# Patient Record
Sex: FEMALE | Race: BLACK OR AFRICAN AMERICAN | Employment: OTHER | URBAN - METROPOLITAN AREA
[De-identification: names, ages, dates, MRNs, and addresses within clinical notes are randomized per-mention and may not be internally consistent; named-entity substitution may affect disease eponyms.]

---

## 2020-07-08 ENCOUNTER — OFFICE VISIT (OUTPATIENT)
Dept: ORTHOPEDIC SURGERY | Age: 65
End: 2020-07-08
Payer: COMMERCIAL

## 2020-07-08 ENCOUNTER — TELEPHONE (OUTPATIENT)
Dept: ORTHOPEDIC SURGERY | Age: 65
End: 2020-07-08

## 2020-07-08 VITALS — WEIGHT: 200 LBS | BODY MASS INDEX: 32.14 KG/M2 | HEIGHT: 66 IN | RESPIRATION RATE: 16 BRPM

## 2020-07-08 PROBLEM — M65.311 TRIGGER FINGER OF RIGHT THUMB: Status: ACTIVE | Noted: 2020-07-08

## 2020-07-08 PROBLEM — M17.0 OSTEOARTHRITIS OF BOTH KNEES: Status: ACTIVE | Noted: 2020-07-08

## 2020-07-08 PROCEDURE — 99203 OFFICE O/P NEW LOW 30 MIN: CPT | Performed by: ORTHOPAEDIC SURGERY

## 2020-07-08 NOTE — LETTER
[facility]  Surgery Scheduling Form:      DEMOGRAPHICS:                                                                                                              .    Patient Name:  Yara Monahan  Patient :  1955   Patient SS#:      Patient Phone:  [phone] (home) [phone] (work) Alt. Patient Phone:    Patient Address:  [id number]    PCP:  Barb DUMONT  Payor/Plan Subscr  Sex Relation Sub. Ins. ID Effective Group Num   1. [facility] 1955 Female  [id number]                                    [id number]       DIAGNOSIS & PROCEDURE:                                                                                            .    Diagnosis:   Right thumb trigger finger M65.311  Operation:  Right thumb trigger finger release  [id number]  Location:  [place]  Surgeon:  Cristhian Arnold MD    SCHEDULING INFORMATION:                                                                                         .    Surgeon's Scheduling Instruction:  Elective (within the next week or so)  Requested Date:    OR Time:   Patient Arrival Time:    OR Time Required:  15  Minutes  Anesthesia:  General    SA Required:  Yes  Equipment:    Mini C-Arm:  No    Standard C-Arm:  No  Status:  Outpatient    PAT Required:  Yes  Latex Allergy:  unknown    Defibrilator or Pacemaker:  unknown  Isolation Precautions:  unknown  Comments:                       Noam Johnson MD 20   BILLING INFORMATION:                                                                                                    .    Procedure:       CPT Code Modifier                  Pre-Certification:

## 2020-07-08 NOTE — PROGRESS NOTES
CHIEF COMPLAINT:   1- Right thumb pain/ trigger thumb. 2- Bilateral knee pain/ DJD    HISTORY:  Ms. Anibal Camacho is 75 y.o.  female right handed presents today for evaluation of a right thumb pain which started May 2020 and is worsening over time. Denies trauma or injury. The patient is complaining of triggering and pain right thumb with no numbness or tingling. This is worse with ROM, rest makes pain better. No other complaint. She had bilateral knees cortisone injection on 4/27/2016 with good improvement.      Past Medical History:   Diagnosis Date    Asthma     Cataract     Diabetes mellitus     High blood pressure        Past Surgical History:   Procedure Laterality Date    ENDOMETRIAL ABLATION      HYSTERECTOMY         Social History     Socioeconomic History    Marital status: Single     Spouse name: Not on file    Number of children: Not on file    Years of education: Not on file    Highest education level: Not on file   Occupational History    Occupation: cook/manager   Social Needs    Financial resource strain: Not on file    Food insecurity     Worry: Not on file     Inability: Not on file    Transportation needs     Medical: Not on file     Non-medical: Not on file   Tobacco Use    Smoking status: Never Smoker   Substance and Sexual Activity    Alcohol use: No    Drug use: No    Sexual activity: Not on file   Lifestyle    Physical activity     Days per week: Not on file     Minutes per session: Not on file    Stress: Not on file   Relationships    Social connections     Talks on phone: Not on file     Gets together: Not on file     Attends Yazdanism service: Not on file     Active member of club or organization: Not on file     Attends meetings of clubs or organizations: Not on file     Relationship status: Not on file    Intimate partner violence     Fear of current or ex partner: Not on file     Emotionally abused: Not on file     Physically abused: Not on file Forced sexual activity: Not on file   Other Topics Concern    Not on file   Social History Narrative    Not on file       Family History   Problem Relation Age of Onset    Diabetes Unknown     High Blood Pressure Unknown     Kidney Disease Unknown        Current Outpatient Medications on File Prior to Visit   Medication Sig Dispense Refill    amLODIPine (NORVASC) 10 MG tablet       losartan (COZAAR) 50 MG tablet       atorvastatin (LIPITOR) 40 MG tablet       metFORMIN (GLUCOPHAGE) 500 MG tablet       glimepiride (AMARYL) 1 MG tablet       naproxen (NAPROSYN) 500 MG tablet Take 1 tablet by mouth 2 times daily (with meals) 60 tablet 0    lovastatin (ALTOPREV) 20 MG ER tablet Take 20 mg by mouth nightly.  zolpidem (AMBIEN) 10 MG tablet Take 10 mg by mouth nightly as needed.  FOLIC ACID by Does not apply route.  metformin (GLUCOPHAGE) 1000 MG tablet Take 1,000 mg by mouth 2 times daily (with meals).  PAROXETINE HCL ER PO Take  by mouth.  Ascorbic Acid (VITAMIN C) 500 MG tablet Take 500 mg by mouth daily. No current facility-administered medications on file prior to visit. Pertinent items are noted in HPI  Review of systems reviewed from Patient History Form dated on 7/8/2020 and available in the patient's chart under the Media tab. No change noted. PHYSICAL EXAMINATION:  Ms. Jake Diaz is a very pleasant 75 y.o.  female who presents today in no acute distress, awake, alert, and oriented. She is well dressed, nourished and  groomed. Patient with normal affect. Height is  5' 6\" (1.676 m), weight is 200 lb (90.7 kg), Body mass index is 32.28 kg/m². Resting respiratory rate is 16. Examination of the gait, showed that the patient walks with no limp . Examination of both upper extremities showing a decrease range of motion with triggering of the right thumb compare to the other side.  There is mild swelling that can be seen with tenderness over A1 emigdio. IMAGING: Lennox Grimm were reviewed, taken 7/6/2020 in [facility], 3 views of the right hand, and showed no fracture. Xray standing PA view of both knees, lateral view, and sunrise views of the bilaterall knee was obtained today in the office and reviewed. These demonstrate moderate degenerative changes with narrowing of the joint space in the medial joint space compartment, subchondral sclerosis, and marginal osteophytosis. Both knees with full ROM, mild crepitus, tenderness on medial joint line, stable to varus and valgus stress. IMPRESSION:    1- Right thumb pain/ trigger thumb. 2- Bilateral knee pain/ DJD    PLAN:  I assured the patient that the xray is negative for acute fracture. The patient can take NSAIDs PRN. We recommended Cortisone injection vs surgery. She would like to proceed with surgery right thumb trigger release. I discussed the risks and benefits of surgery with the patient, including but not limited to infection, bleeding, pain, injury to nerves or blood vessels failure of the surgery and need for additional surgery. All the patient's questions were answered. We discussed an expected post-operative course. She  is understanding of this and wishes to proceed.          Dominique Rivas MD

## 2020-11-30 ENCOUNTER — OFFICE VISIT (OUTPATIENT)
Dept: ENDOCRINOLOGY | Age: 65
End: 2020-11-30
Payer: COMMERCIAL

## 2020-11-30 VITALS
WEIGHT: 200 LBS | OXYGEN SATURATION: 96 % | HEIGHT: 66 IN | DIASTOLIC BLOOD PRESSURE: 76 MMHG | BODY MASS INDEX: 32.14 KG/M2 | HEART RATE: 76 BPM | RESPIRATION RATE: 14 BRPM | SYSTOLIC BLOOD PRESSURE: 128 MMHG

## 2020-11-30 PROCEDURE — 99204 OFFICE O/P NEW MOD 45 MIN: CPT | Performed by: INTERNAL MEDICINE

## 2020-11-30 RX ORDER — GLIPIZIDE 5 MG/1
5 TABLET, FILM COATED, EXTENDED RELEASE ORAL DAILY
Qty: 30 TABLET | Refills: 3 | Status: SHIPPED | OUTPATIENT
Start: 2020-11-30 | End: 2021-03-01 | Stop reason: SDUPTHER

## 2020-11-30 RX ORDER — SERTRALINE HYDROCHLORIDE 25 MG/1
25 TABLET, FILM COATED ORAL DAILY
COMMUNITY

## 2020-11-30 NOTE — PROGRESS NOTES
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 Worsened by high CHO    No Hx of CAD , PVD, CVA    Hyperlipidemia:   For   Years  Takes lipitor 40mg  Controlled  LDL 71 on 9/20    Hypertension for years  Stable  Takes norvasc 10mg losartan 50mg      Last eye exam: 5/20  Last foot exam: 11/20  Last microalbumin to creatinine ratio: 10/19    B. Subtotal distal pancreatectomy/splenectomy:  - Well-differentiated pancreatic neuroendocrine neoplasm.  - See microscopic description.  - Eleven peripancreatic and 1 splenic hilar lymph nodes (0/12). - Chronic pancreatitis with islet cell hyperplasia. - Spleen without histopathologic abnormality. She has a h/o toxic nodule per record but euthyroid    8/11 thyroid scan         Impression:         1. Hyperfunctioning nodule within the inferior portion of the right lobe   of       the thyroid.       2.  The 24-hour thyroid uptake is within normal range suggesting a   euthyroid    TSH 1.14  On 7/20  0.43 on 5/11    Does c/o tingling in big toes, h/o mild neuropathy per records    Past Medical History:   Diagnosis Date    Asthma     Cataract     Diabetes mellitus ([facility] [address]     High blood pressure      Past Surgical History:   Procedure Laterality Date    ENDOMETRIAL ABLATION      HYSTERECTOMY skin lesions, toenails are normal, 10 g monofilament is detected    Lab Reviewed   No components found for: CHLPL  Lab Results   Component Value Date    TRIG 149 08/30/2011     Lab Results   Component Value Date    HDL 59 08/30/2011     Lab Results   Component Value Date    LDLCALC 224 (H) 08/30/2011     No results found for: LABVLDL  No results found for: LABA1C    Assessment:     Eliberto Schilder is a 77 y.o. female with :    1.DM : Secondary to pancreatectomy per records. On metformin. A1c slightly high. Discussed goals, risk of complications, cost is an issue with other medications, will add low dose glipizide. Advised with weight loss and diet changes, may be able to decrease medication  2. HTN : Blood pressure at goal  3. HLD ; LDL at goal  4. Obesity: Advised weight loss  5. Thyroid Nodule : Will need thyroid USG , monitor TFT  6.Neuropathy: Mild, monitor    Plan:      Metformin    Add low dose glipizide   Advised to check blood sugar 4 times a day   Patient to send blood sugar log for titration. Advise to exercise regularly, Advise to low simple carbohydrate and protein with each  meal diet. Diabetes Care: recommend yearly eye exam, foot exam and urine microalbumin to   creatinine ratio.

## 2020-12-01 ENCOUNTER — HOSPITAL ENCOUNTER (OUTPATIENT)
Dept: ULTRASOUND IMAGING | Age: 65
Discharge: HOME | End: 2020-12-01
Payer: COMMERCIAL

## 2020-12-01 PROCEDURE — 76536 US EXAM OF HEAD AND NECK: CPT

## 2020-12-01 NOTE — PROGRESS NOTES
Right thyroid lobe:  4.2 x 1.8 x 1.6 cm     Left thyroid lobe:  3.8 x 1.0 x 1.0 cm     Isthmus:  0.4 cm     Thyroid Gland:  Thyroid gland demonstrates normal echotexture and vascularity.     Nodules:     NODULE: Right 1     Size: 16 x 12 x 10 mm    Spongiform, monitor

## 2021-03-01 ENCOUNTER — OFFICE VISIT (OUTPATIENT)
Dept: ENDOCRINOLOGY | Age: 66
End: 2021-03-01
Payer: COMMERCIAL

## 2021-03-01 VITALS
OXYGEN SATURATION: 96 % | HEART RATE: 75 BPM | HEIGHT: 66 IN | SYSTOLIC BLOOD PRESSURE: 145 MMHG | DIASTOLIC BLOOD PRESSURE: 78 MMHG | RESPIRATION RATE: 18 BRPM | BODY MASS INDEX: 32.43 KG/M2 | WEIGHT: 201.8 LBS

## 2021-03-01 DIAGNOSIS — E08.65 DIABETES MELLITUS DUE TO UNDERLYING CONDITION, UNCONTROLLED, WITH HYPERGLYCEMIA (HCC): Primary | ICD-10-CM

## 2021-03-01 LAB — HBA1C MFR BLD: 6.1 %

## 2021-03-01 PROCEDURE — 83036 HEMOGLOBIN GLYCOSYLATED A1C: CPT | Performed by: INTERNAL MEDICINE

## 2021-03-01 PROCEDURE — 99213 OFFICE O/P EST LOW 20 MIN: CPT | Performed by: INTERNAL MEDICINE

## 2021-03-01 RX ORDER — GLIPIZIDE 2.5 MG/1
2.5 TABLET, EXTENDED RELEASE ORAL DAILY
Qty: 30 TABLET | Refills: 3 | Status: SHIPPED | OUTPATIENT
Start: 2021-03-01 | End: 2022-04-04 | Stop reason: SDUPTHER

## 2021-03-01 RX ORDER — DOXYCYCLINE HYCLATE 100 MG/1
100 CAPSULE ORAL 2 TIMES DAILY
COMMUNITY

## 2021-03-01 RX ORDER — HYDROXYZINE HYDROCHLORIDE 25 MG/1
25 TABLET, FILM COATED ORAL
COMMUNITY
Start: 2020-09-14

## 2021-03-01 NOTE — PROGRESS NOTES
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 Worsened by high CHO    No Hx of CAD , PVD, CVA    Hyperlipidemia:   For   Years  Takes lipitor 40mg  Controlled  LDL 71 on 9/20    Hypertension for years  Stable  Takes norvasc 10mg losartan 50mg      Last eye exam: 5/20  Last foot exam: 11/20  Last microalbumin to creatinine ratio: 10/19    B. Subtotal distal pancreatectomy/splenectomy:  - Well-differentiated pancreatic neuroendocrine neoplasm.  - See microscopic description.  - Eleven peripancreatic and 1 splenic hilar lymph nodes (0/12). - Chronic pancreatitis with islet cell hyperplasia. - Spleen without histopathologic abnormality. She has a h/o toxic nodule per record but euthyroid    8/11 thyroid scan         Impression:         1. Hyperfunctioning nodule within the inferior portion of the right lobe   of       the thyroid.       2.  The 24-hour thyroid uptake is within normal range suggesting a   euthyroid    TSH 1.14  On 7/20  0.43 on 5/11 12/20  Right thyroid lobe:  4.2 x 1.8 x 1.6 cm     Left thyroid lobe:  3.8 x 1.0 x 1.0 cm     Isthmus:  0.4 cm     Thyroid Gland:  Thyroid gland demonstrates normal echotexture and vascularity.     Nodules:     NODULE: Right redness  Neck: supple, symmetrical, no swelling  Skin: No obvious rashes or lesions present. Skin and hair texture normal  Psychiatric: Judgement and Insight:  judgement and insight appear normal  Neuro: Normal without focal findings, speech is normal normal, speech is spontaneous  Chest: No labored breathing, no chest deformity, no stridor  Musculoskeletal: No joint deformity, swelling    Lab Reviewed   No components found for: CHLPL  Lab Results   Component Value Date    TRIG 149 08/30/2011     Lab Results   Component Value Date    HDL 59 08/30/2011     Lab Results   Component Value Date    LDLCALC 224 (H) 08/30/2011     No results found for: LABVLDL  No results found for: LABA1C    Assessment:     Vashti Ford is a 77 y.o. female with :    1.DM : Secondary to pancreatectomy per records. On metformin. A1c slightly high. Discussed goals, risk of complications, cost is an issue with other medications, added low dose glipizide. Advised with weight loss and diet changes, may be able to decrease medication  Glipizide stopped by PCP due to concern for low. Advised patient to start on januvia as glucose higher but she would like to restart glipizide due to cost of other medications  Glipizide had helped  2. HTN : Blood pressure at goal  3. HLD ; LDL at goal  4. Obesity: Advised weight loss  5. Thyroid Nodule : 1.6cm spongiform nodule, monitor  6. Neuropathy: Mild, monitor. Add B12    Plan:      Metformin    Add back low dose glipizide 2.5mg   Advised to check blood sugar 4 times a day   Patient to send blood sugar log for titration. Advise to exercise regularly, Advise to low simple carbohydrate and protein with each  meal diet. Diabetes Care: recommend yearly eye exam, foot exam and urine microalbumin to   creatinine ratio.

## 2021-03-05 ENCOUNTER — TELEPHONE (OUTPATIENT)
Dept: ENDOCRINOLOGY | Age: 66
End: 2021-03-05

## 2021-03-05 DIAGNOSIS — E13.9 DM (DIABETES MELLITUS), SECONDARY (HCC): Primary | ICD-10-CM

## 2021-03-05 NOTE — TELEPHONE ENCOUNTER
Patient called and said she was supposed to have blood work done to check her pancreas.    Can that be put in

## 2021-03-22 ENCOUNTER — TELEPHONE (OUTPATIENT)
Dept: ENDOCRINOLOGY | Age: 66
End: 2021-03-22

## 2021-03-22 NOTE — TELEPHONE ENCOUNTER
There are different test for the pancreas.  Would need to discuss at visit about indication and need for testing

## 2021-03-22 NOTE — TELEPHONE ENCOUNTER
I reviewed the results. The ratio of micro albumin /Creatinine is okay , it does not indicate any abnormality.  No additional treatment needed

## 2021-03-22 NOTE — TELEPHONE ENCOUNTER
Pt had a urine test done with her PCP and there was protein in her urine . It was done thru [facility] . They told her to get in touch with Dr. Carrie Espinosa

## 2021-03-22 NOTE — TELEPHONE ENCOUNTER
Called and informed PT of MD message  There are different test for the pancreas.  Would need to discuss at visit about indication and need for testing  PT verbally understood

## 2021-03-22 NOTE — TELEPHONE ENCOUNTER
Called and gave PT md message   I reviewed the results. The ratio of micro albumin /Creatinine is okay , it does not indicate any abnormality.  No additional treatment needed

## 2021-05-03 ENCOUNTER — TELEPHONE (OUTPATIENT)
Dept: ENDOCRINOLOGY | Age: 66
End: 2021-05-03

## 2021-05-03 NOTE — TELEPHONE ENCOUNTER
Patient called and wants to talk to Dr. Mihaela Tobar nurse regarding her medical condition.      Please call [phone]

## 2021-05-28 ENCOUNTER — TELEPHONE (OUTPATIENT)
Dept: ENDOCRINOLOGY | Age: 66
End: 2021-05-28

## 2021-05-28 NOTE — TELEPHONE ENCOUNTER
I spoke with this patient regarding her appt next Tue 6/1/21 with Dr. Kelly Carlson. ..she wanted me to express some concerns she has with her blood sugar levels running a bit high even with her having increased her glipizide medication as advised. They have been running between 179-180 daily. She would like for someone to please follow up with her today. [phone].

## 2021-05-28 NOTE — TELEPHONE ENCOUNTER
Called and informed PT of MD message  I would advise to continue the same dose. May have to discuss adding a new medication next week. Will check A1c in office.  She should bring her log to the visit

## 2021-06-01 ENCOUNTER — OFFICE VISIT (OUTPATIENT)
Dept: ENDOCRINOLOGY | Age: 66
End: 2021-06-01
Payer: COMMERCIAL

## 2021-06-01 VITALS
BODY MASS INDEX: 32.69 KG/M2 | DIASTOLIC BLOOD PRESSURE: 67 MMHG | HEIGHT: 66 IN | WEIGHT: 203.4 LBS | SYSTOLIC BLOOD PRESSURE: 126 MMHG | HEART RATE: 82 BPM | OXYGEN SATURATION: 97 %

## 2021-06-01 DIAGNOSIS — E13.9 DM (DIABETES MELLITUS), SECONDARY (HCC): ICD-10-CM

## 2021-06-01 DIAGNOSIS — E04.1 THYROID NODULE: ICD-10-CM

## 2021-06-01 DIAGNOSIS — E13.9 DM (DIABETES MELLITUS), SECONDARY (HCC): Primary | ICD-10-CM

## 2021-06-01 LAB
ANION GAP SERPL CALCULATED.3IONS-SCNC: 13 MMOL/L (ref 3–16)
BUN BLDV-MCNC: 11 MG/DL (ref 7–20)
CALCIUM SERPL-MCNC: 10.1 MG/DL (ref 8.3–10.6)
CHLORIDE BLD-SCNC: 103 MMOL/L (ref 99–110)
CO2: 26 MMOL/L (ref 21–32)
CREAT SERPL-MCNC: 1 MG/DL (ref 0.6–1.2)
CREATININE URINE: 281.9 MG/DL (ref 28–259)
GFR AFRICAN AMERICAN: >60
GFR NON-AFRICAN AMERICAN: 55
GLUCOSE BLD-MCNC: 140 MG/DL (ref 70–99)
HBA1C MFR BLD: 8.1 %
MICROALBUMIN UR-MCNC: 1.8 MG/DL
MICROALBUMIN/CREAT UR-RTO: 6.4 MG/G (ref 0–30)
POTASSIUM SERPL-SCNC: 4.4 MMOL/L (ref 3.5–5.1)
SODIUM BLD-SCNC: 142 MMOL/L (ref 136–145)
TSH REFLEX: 0.74 UIU/ML (ref 0.27–4.2)

## 2021-06-01 PROCEDURE — 99214 OFFICE O/P EST MOD 30 MIN: CPT | Performed by: INTERNAL MEDICINE

## 2021-06-01 PROCEDURE — 83036 HEMOGLOBIN GLYCOSYLATED A1C: CPT | Performed by: INTERNAL MEDICINE

## 2021-06-01 RX ORDER — ATORVASTATIN CALCIUM 80 MG/1
TABLET, FILM COATED ORAL
COMMUNITY
Start: 2021-04-29

## 2021-06-01 RX ORDER — AZELASTINE HYDROCHLORIDE 0.5 MG/ML
SOLUTION/ DROPS OPHTHALMIC
COMMUNITY
Start: 2021-05-31

## 2021-06-01 RX ORDER — PREDNISOLONE ACETATE 10 MG/ML
SUSPENSION/ DROPS OPHTHALMIC
COMMUNITY
Start: 2021-05-12

## 2021-06-01 RX ORDER — AZELASTINE 1 MG/ML
2 SPRAY, METERED NASAL 2 TIMES DAILY
COMMUNITY
Start: 2021-04-23

## 2021-06-01 RX ORDER — LOSARTAN POTASSIUM 100 MG/1
TABLET ORAL
COMMUNITY
Start: 2021-04-29

## 2021-06-01 RX ORDER — PRAMIPEXOLE DIHYDROCHLORIDE 0.5 MG/1
TABLET ORAL
COMMUNITY
Start: 2021-05-11

## 2021-06-01 NOTE — PROGRESS NOTES
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)  NET per records, not malignant    Known diabetic complications: none   Uncontrolled, moderate    Current diabetic medications     Metformin 1gm BID  Glipizide 2.5mg BID    H/o glimepiride   [facility] and [place] costly  States needs medication which is cheaper    Last A1c  8.1%<-----6.1%<-----7.6%<----- 7.1% <--- 6.9 <--- 8.3    Prior visit with dietician: Yes   Current diet: on average, 3 meals per day   Current exercise: walking   Current monitoring regimen: home blood tests -1-2   times daily     Has brought blood glucose log/meter: yes  Home blood sugar records:  Any episodes of hypoglycemia? Worsened by high CHO    No Hx of CAD , PVD, CVA    Hyperlipidemia:   For   Years  Takes lipitor 40mg  Controlled  LDL 71 on 9/20    Hypertension for years  Stable  Takes norvasc 10mg losartan 50mg      Last eye exam: 5/20  Last foot exam: 11/20  Last microalbumin to creatinine ratio: 10/19    B. Subtotal distal pancreatectomy/splenectomy:  - Well-differentiated pancreatic neuroendocrine neoplasm.  - See microscopic description.  - Eleven peripancreatic and 1 splenic hilar lymph nodes (0/12). - Chronic pancreatitis with islet cell hyperplasia. - Spleen without histopathologic abnormality. She has a h/o toxic nodule per record but euthyroid    8/11 thyroid scan         Impression:         1. Hyperfunctioning nodule within the inferior portion of the right lobe   of       the thyroid.       2.  The 24-hour thyroid uptake is within normal range suggesting a   euthyroid    TSH 1.14  On 7/20  0.43 on 5/11 12/20  Right thyroid lobe:  4.2 x 1.8 x 1.6 cm     Left thyroid lobe:  3.8 x 1.0 x 1.0 cm     Isthmus:  0.4 cm     Thyroid Gland:  Thyroid gland demonstrates normal echotexture and vascularity.     Nodules:     NODULE: Right 1     Size: 16 x 12 x 10 mm    Spongiform, monitor    Does c/o tingling in big toes, h/o mild neuropathy per records    Past Medical History:   Diagnosis Date    Asthma     Cataract     Diabetes mellitus ([address]     High blood pressure      Past Surgical History:   Procedure Laterality Date    ENDOMETRIAL ABLATION      HYSTERECTOMY       Current Outpatient Medications   Medication Sig Dispense Refill    azelastine (OPTIVAR) 0.05 % ophthalmic solution       atorvastatin (LIPITOR) 80 MG tablet       azelastine (ASTELIN) 0.1 % nasal spray 2 sprays by Nasal route 2 times daily      prednisoLONE acetate (PRED FORTE) 1 % ophthalmic suspension INSTILL 1 DROP INTO EACH EYE 4 TIMES DAILY      pramipexole (MIRAPEX) 0.5 MG tablet TAKE 1 TABLET BY MOUTH ONCE DAILY AT BEDTIME FOR 60 DAYS      metFORMIN (GLUCOPHAGE) 500 MG tablet       losartan (COZAAR) 100 MG tablet       hydrOXYzine (ATARAX) 25 MG tablet Take 25 mg by mouth      doxycycline hyclate (VIBRAMYCIN) 100 MG capsule Take 100 mg by mouth 2 times daily      glipiZIDE (GLUCOTROL XL) 2.5 MG extended release tablet Take 1 tablet by mouth daily 30 tablet 3    sertraline (ZOLOFT) 25 MG tablet Take 25 mg by mouth daily      APPLE CIDER VINEGAR PO Take by mouth      Bromelains (BROMELAIN PO) Take by mouth      Famotidine (PEPCID AC PO) Take by mouth      amLODIPine (NORVASC) 10 MG tablet       lovastatin (ALTOPREV) 20 MG ER tablet Take 20 mg by mouth nightly.  FOLIC ACID by Does not apply route.  Ascorbic Acid (VITAMIN C) 500 MG tablet Take 500 mg by mouth daily. No current facility-administered medications for this visit.        Review of Systems  Please see scanned document dated and signed      Objective:      /67   Pulse 82   Ht 5' 6\" (1.676 m)   Wt 203 lb 6.4 oz (92.3 kg)   SpO2 97%   Breastfeeding No   BMI 32.83 kg/m²   Wt Readings from Last 3 Encounters:   06/01/21 203 lb 6.4 oz (92.3 kg)   03/01/21 201 lb 12.8 oz (91.5 kg)   11/30/20 200 lb (90.7 kg)     Constitutional: Well-developed, appears stated age, cooperative, in no acute distress  H/E/N/M/T:atraumatic, normocephalic, external ears, nose, lips normal without lesions  Eyes: Lids, lashes, conjunctivae and sclerae normal, No proptosis, no redness  Neck: supple, symmetrical, no swelling  Skin: No obvious rashes or lesions present. Skin and hair texture normal  Psychiatric: Judgement and Insight:  judgement and insight appear normal  Neuro: Normal without focal findings, speech is normal normal, speech is spontaneous  Chest: No labored breathing, no chest deformity, no stridor  Musculoskeletal: No joint deformity, swelling    Lab Reviewed   No components found for: CHLPL  Lab Results   Component Value Date    TRIG 149 08/30/2011     Lab Results   Component Value Date    HDL 59 08/30/2011     Lab Results   Component Value Date    LDLCALC 224 (H) 08/30/2011     No results found for: LABVLDL  Lab Results   Component Value Date    LABA1C 6.1 03/01/2021       Assessment:     Tawana Farias is a 77 y.o. female with :    1.DM : Secondary to pancreatectomy per records. On metformin. A1c  high. Discussed goals, risk of complications, cost is an issue with other medications, added low dose glipizide. Advised with weight loss and diet changes, may be able to decrease medication  Glipizide stopped by PCP due to concern for low. Advised patient to start on januvia as glucose higher , she was concerned about cost. Check tradjenta cost  Can increase glipizide dose but may have more risk of hypoglycemia  Discussed insulin, she wants to avoid. She states will work on diet changes  2. HTN : Blood pressure at goal  3. HLD ; LDL at goal  4. Obesity: Advised weight loss  5. Thyroid Nodule : 1.6cm spongiform nodule, monitor. Check TSH  6.Neuropathy: Mild, monitor.  Add B12    Plan:      Metformin     glipizide 2.5mg BID   Advised to check blood sugar 4 times a day   Patient to send blood sugar log for titration. Advise to exercise regularly, Advise to low simple carbohydrate and protein with each  meal diet. Diabetes Care: recommend yearly eye exam, foot exam and urine microalbumin to   creatinine ratio.

## 2021-06-03 ENCOUNTER — TELEPHONE (OUTPATIENT)
Dept: ENDOCRINOLOGY | Age: 66
End: 2021-06-03

## 2021-06-03 LAB — C-PEPTIDE: 4.8 NG/ML (ref 1.1–4.4)

## 2021-06-03 NOTE — TELEPHONE ENCOUNTER
Thyroid and urine test normal  Discussed high A1c at visit, adding insulin, she wanted to wait, work on diet changes.  Await C-peptide

## 2021-06-08 ENCOUNTER — OFFICE VISIT (OUTPATIENT)
Dept: ENDOCRINOLOGY | Age: 66
End: 2021-06-08
Payer: COMMERCIAL

## 2021-06-08 DIAGNOSIS — E13.9 DM (DIABETES MELLITUS), SECONDARY (HCC): ICD-10-CM

## 2021-06-08 PROCEDURE — 97803 MED NUTRITION INDIV SUBSEQ: CPT | Performed by: DIETITIAN, REGISTERED

## 2021-06-08 NOTE — PROGRESS NOTES
Medical Nutrition Therapy for Diabetes    Puja Manrique  June 8, 2021      Patient Care Team:  Matt Salgado MD as PCP - General (Allergy)    Reason for visit: Diabetes, Secondary     ASSESSMENT/PLAN:   NUTRITION DIAGNOSIS  Initial visit    #1 Problem: Altered Nutrition-Related Laboratory Values (NC-2.2)  Related to: Endocrine/Diabetes   As Evidenced by: Elevated Plasma glucose and/or HgbA1c levels         #2 Problem: Inconsistent Carbohydrate and Fiber Intake  Related to: Food- and nutrition-related knowledge deficit concerning appropriate amount of carbohydrate and fiber intake  As evidenced by: patient food recall    #3 Problem: Excessive Carbohydrate Intake (NI-5.8. 2)  Related to: Food-and nutrition-related knowledge deficit concerning appropriate amount of carbohydrate intake  As evidenced by: Estimated carbohydrate intake that is consistently more than the recommended amounts    NUTRITION INTERVENTION  Nutrition Prescription: 30 grams carbohydrate per meal with protein and non-starch vegetables  15 gram carbohydrate snacks    Diabetes Education/Counseling included: Pathophysiology of Diabetes  Carbohydrate Control, Activity/exercise, Monitoring, Medications and other: benefits of adequate hydration, quality sleep and stress management. Interventions:  Control Carbohydrate Intake using Plate Guide, Control Carbohydrate Intake using Carb Counting and Increase activity level by walking, have nutrient dense carbohydrate foods in reduced portions. Recommended stopping fluids at 6-7 pm to improve sleep duration. Consult sleep specialist for ideas to help with reduced noise from C-Pap.   Advised increased protein/meat portions to 5-6 ounces lunch and dinner;1-2 ounces breakfast.  Handouts:  Sample menus-[place], Planning Healthy Meals-[employer] Education  NUTRITION MONITORING AND EVALUATION  30 gram carbohydrate meals  Consult sleep specialist to direct on C-Pap   Increase protein   Limit time for drinking fluids       Patient Active Problem List   Diagnosis    Knee pain    Shoulder pain, right    Rotator cuff (capsule) sprain    Primary localized osteoarthrosis, lower leg    Right knee sprain    Osteoarthritis of both knees    Trigger finger of right thumb       Current Outpatient Medications   Medication Sig Dispense Refill    azelastine (OPTIVAR) 0.05 % ophthalmic solution       atorvastatin (LIPITOR) 80 MG tablet       azelastine (ASTELIN) 0.1 % nasal spray 2 sprays by Nasal route 2 times daily      prednisoLONE acetate (PRED FORTE) 1 % ophthalmic suspension INSTILL 1 DROP INTO EACH EYE 4 TIMES DAILY      pramipexole (MIRAPEX) 0.5 MG tablet TAKE 1 TABLET BY MOUTH ONCE DAILY AT BEDTIME FOR 60 DAYS      metFORMIN (GLUCOPHAGE) 500 MG tablet       losartan (COZAAR) 100 MG tablet       linagliptin (TRADJENTA) 5 MG tablet Take 1 tablet by mouth daily 90 tablet 1    hydrOXYzine (ATARAX) 25 MG tablet Take 25 mg by mouth      doxycycline hyclate (VIBRAMYCIN) 100 MG capsule Take 100 mg by mouth 2 times daily      glipiZIDE (GLUCOTROL XL) 2.5 MG extended release tablet Take 1 tablet by mouth daily 30 tablet 3    sertraline (ZOLOFT) 25 MG tablet Take 25 mg by mouth daily      APPLE CIDER VINEGAR PO Take by mouth      Bromelains (BROMELAIN PO) Take by mouth      Famotidine (PEPCID AC PO) Take by mouth      amLODIPine (NORVASC) 10 MG tablet       lovastatin (ALTOPREV) 20 MG ER tablet Take 20 mg by mouth nightly.  FOLIC ACID by Does not apply route.  Ascorbic Acid (VITAMIN C) 500 MG tablet Take 500 mg by mouth daily. No current facility-administered medications for this visit.          NUTRITION ASSESSMENT    Biochemical Data:    Lab Results   Component Value Date    LABA1C 8.1 06/01/2021     No results found for: EAG    Lab Results   Component Value Date    CHOL 313 (H) 08/30/2011     Lab Results   Component Value Date    TRIG 149 08/30/2011     Lab Results   Component Value Date    HDL 59 weekly  Alcohol consumption: No  Usual Food consumption:   3 meals, 45-60 gram carbohydrate meals, modifications made in portions recently     Barriers:   -none          Follow Up Plan: 2 months  Contact information provided.     Referring Provider: Enoch Wong MD  Time spent with patient: 60 minutes

## 2021-06-08 NOTE — LETTER
[facility]  Phone: [phone]  Fax: [phone]    Bruce Avitia RD, LD    Luiza 10, 2021     Mejia Bautista, [address]    Patient: Fer Parsons   MR Number: <[id number]>   YOB: 1955   Date of Visit: 6/8/2021       Dear Mejia Bautista: Thank you for referring Corrinne Filbert to me for evaluation/treatment. Below are the relevant portions of my assessment and plan of care. If you have questions, please do not hesitate to call me. I look forward to following Malissa Rosado along with you.     Sincerely,    Ernst Lyon RD, LD    Ray Wayna Epley, RD, LD

## 2021-06-16 ENCOUNTER — TELEPHONE (OUTPATIENT)
Dept: ENDOCRINOLOGY | Age: 66
End: 2021-06-16

## 2021-06-16 NOTE — TELEPHONE ENCOUNTER
Patient would like to know if foot detox is something she can look into.  And what is the status of detox for a patient like her

## 2021-06-16 NOTE — TELEPHONE ENCOUNTER
Patient has questions concerning her diabetes. She would like for someone to call her back today as soon as possible.

## 2021-06-16 NOTE — TELEPHONE ENCOUNTER
We usually do not recommend Detox treatment.  If she does plan to have it, needs to monitor her glucose during it

## 2021-06-16 NOTE — TELEPHONE ENCOUNTER
Called and informed PT of MD message  We usually do not recommend Detox treatment.  If she does plan to have it, needs to monitor her glucose during it  Pt verbalized understanding

## 2021-07-14 ENCOUNTER — TELEPHONE (OUTPATIENT)
Dept: ENDOCRINOLOGY | Age: 66
End: 2021-07-14

## 2021-07-14 NOTE — TELEPHONE ENCOUNTER
Patient called asking about  recently. She has also seen readings of 200 periodically. She has started soft food selections as a result of Gastric Ulcer diagnosis. Recommended 5-6 small meals. Continue soft foods (avoid fried and spicy food). Encouraged 15-30 gram carbohydrate and protein and non-starch vegetables each meal.  Patient has follow up with me in August 2021.

## 2021-09-01 ENCOUNTER — TELEPHONE (OUTPATIENT)
Dept: ENDOCRINOLOGY | Age: 66
End: 2021-09-01

## 2021-09-01 NOTE — TELEPHONE ENCOUNTER
Yes she can see Jose Luis Koch for the virtual visits.   She also has a thyroid nodule so will need to see me once a year

## 2021-09-01 NOTE — TELEPHONE ENCOUNTER
Patient cancelled her appt today due to car trouble. She is requesting to switch to [facility] for virtual visits.

## 2022-04-04 ENCOUNTER — OFFICE VISIT (OUTPATIENT)
Dept: ENDOCRINOLOGY | Age: 67
End: 2022-04-04
Payer: COMMERCIAL

## 2022-04-04 VITALS
BODY MASS INDEX: 30.57 KG/M2 | SYSTOLIC BLOOD PRESSURE: 121 MMHG | HEART RATE: 82 BPM | WEIGHT: 190.2 LBS | DIASTOLIC BLOOD PRESSURE: 72 MMHG | HEIGHT: 66 IN | OXYGEN SATURATION: 96 %

## 2022-04-04 DIAGNOSIS — E23.6 EMPTY SELLA (HCC): ICD-10-CM

## 2022-04-04 DIAGNOSIS — E13.9 DM (DIABETES MELLITUS), SECONDARY (HCC): Primary | ICD-10-CM

## 2022-04-04 LAB — HBA1C MFR BLD: 9.4 %

## 2022-04-04 PROCEDURE — 83036 HEMOGLOBIN GLYCOSYLATED A1C: CPT | Performed by: INTERNAL MEDICINE

## 2022-04-04 PROCEDURE — 99214 OFFICE O/P EST MOD 30 MIN: CPT | Performed by: INTERNAL MEDICINE

## 2022-04-04 RX ORDER — ALOGLIPTIN 25 MG/1
25 TABLET, FILM COATED ORAL DAILY
Qty: 30 TABLET | Refills: 3 | Status: SHIPPED | OUTPATIENT
Start: 2022-04-04 | End: 2022-04-06

## 2022-04-04 RX ORDER — SUCRALFATE 1 G/1
1 TABLET ORAL
COMMUNITY
Start: 2021-08-24

## 2022-04-04 RX ORDER — GLIPIZIDE 5 MG/1
5 TABLET, FILM COATED, EXTENDED RELEASE ORAL DAILY
Qty: 30 TABLET | Refills: 3 | Status: SHIPPED | OUTPATIENT
Start: 2022-04-04 | End: 2022-04-12

## 2022-04-04 RX ORDER — METHYLPREDNISOLONE 4 MG/1
TABLET ORAL
COMMUNITY
Start: 2022-03-30 | End: 2022-08-24 | Stop reason: ALTCHOICE

## 2022-04-04 RX ORDER — TIZANIDINE 2 MG/1
2 TABLET ORAL EVERY 8 HOURS PRN
COMMUNITY
Start: 2022-03-04

## 2022-04-04 NOTE — PROGRESS NOTES
Seen as  patient for diabetes    Interim:    She had dizziness/fall , hospitalized  MRI showed partial empty sella  Head pressure  On Medtrol dose pack  Will complete it by tomorrow    Partially empty sella which can be associated with hormonal abnormalities such as panhypopituitarism and hypothyroidism. No acute abnormality on MRI      Referred by friend  Had seen endocrinology at 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)  NET per records, not malignant    Known diabetic complications: none   Uncontrolled, moderate    Current diabetic medications     Metformin 1gm BID  Glipizide 2.5mg daily    tradjenta costly    H/o glimepiride   [facility] [place] costly  States needs medication which is cheaper    Last A1c  8.6%<-----8.1%<-----6.1%<-----7.6%<----- 7.1% <--- 6.9 <--- 8.3    Prior visit with dietician: Yes   Current diet: on average, 3 meals per day   Current exercise: walking   Current monitoring regimen: home blood tests -1-2   times daily     Has brought blood glucose log/meter: yes  Home blood sugar records:  Any episodes of hypoglycemia? Worsened by high CHO    No Hx of CAD , PVD, CVA    Hyperlipidemia:   For   Years  Takes lipitor 40mg  Controlled  LDL 71 on 9/20    Hypertension for years  Stable  Takes norvasc 10mg losartan 50mg      Last eye exam: 5/20  Last foot exam: 11/20  Last microalbumin to creatinine ratio: 10/19    B. Subtotal distal pancreatectomy/splenectomy:  - Well-differentiated pancreatic neuroendocrine neoplasm.  - See microscopic description.  - Eleven peripancreatic and 1 splenic hilar lymph nodes (0/12). - Chronic pancreatitis with islet cell hyperplasia. - Spleen without histopathologic abnormality. She has a h/o toxic nodule per record but euthyroid    8/11 thyroid scan         Impression:         1. Hyperfunctioning nodule within the inferior portion of the right lobe   of       the thyroid.       2.  The 24-hour thyroid uptake is within normal range suggesting a   euthyroid    TSH 1.14  On 7/20  0.43 on 5/11 12/20  Right thyroid lobe:  4.2 x 1.8 x 1.6 cm     Left thyroid lobe:  3.8 x 1.0 x 1.0 cm     Isthmus:  0.4 cm     Thyroid Gland:  Thyroid gland demonstrates normal echotexture and vascularity.     Nodules:     NODULE: Right 1     Size: 16 x 12 x 10 mm    Spongiform, monitor    Does c/o tingling in big toes, h/o mild neuropathy per records    Past Medical History:   Diagnosis Date    Asthma     Cataract     Diabetes mellitus ([facility] [address]     High blood pressure      Past Surgical History:   Procedure Laterality Date    ENDOMETRIAL ABLATION      HYSTERECTOMY       Current Outpatient Medications   Medication Sig Dispense Refill    methylPREDNISolone (MEDROL DOSEPACK) 4 MG tablet TAKE BY MOUTH AS DIRECTED      sertraline (ZOLOFT) 50 MG tablet TAKE 1 TABLET BY MOUTH ONCE DAILY      sucralfate (CARAFATE) 1 GM tablet Take 1 g by mouth 4 times daily (before meals and nightly)      tiZANidine (ZANAFLEX) 2 MG tablet Take 2 mg by mouth every 8 hours as needed      azelastine (OPTIVAR) 0.05 % ophthalmic solution       atorvastatin (LIPITOR) 80 MG tablet       azelastine (ASTELIN) 0.1 % nasal spray 2 sprays by Nasal route 2 times daily      prednisoLONE acetate (PRED FORTE) 1 % ophthalmic suspension INSTILL 1 DROP INTO EACH EYE 4 TIMES DAILY      pramipexole (MIRAPEX) 0.5 MG tablet TAKE 1 TABLET BY MOUTH ONCE DAILY AT BEDTIME FOR 60 DAYS      metFORMIN (GLUCOPHAGE) 500 MG tablet       losartan (COZAAR) 100 MG tablet       linagliptin (TRADJENTA) 5 MG tablet Take 1 tablet by mouth daily 90 tablet 1    hydrOXYzine (ATARAX) 25 MG tablet Take 25 mg by mouth      doxycycline hyclate (VIBRAMYCIN) 100 MG capsule Take 100 mg by mouth 2 times daily      sertraline (ZOLOFT) 25 MG tablet Take 25 mg by mouth daily      APPLE CIDER VINEGAR PO Take by mouth      Bromelains (BROMELAIN PO) Take by mouth      Famotidine (PEPCID AC PO) Take by mouth      amLODIPine (NORVASC) 10 MG tablet       lovastatin (ALTOPREV) 20 MG ER tablet Take 20 mg by mouth nightly.  FOLIC ACID by Does not apply route.  Ascorbic Acid (VITAMIN C) 500 MG tablet Take 500 mg by mouth daily.  glipiZIDE (GLUCOTROL XL) 2.5 MG extended release tablet Take 1 tablet by mouth daily 30 tablet 3     No current facility-administered medications for this visit. Review of Systems  Please see scanned document dated and signed      Objective:      /72   Pulse 82   Ht 5' 6\" (1.676 m)   Wt 190 lb 3.2 oz (86.3 kg)   SpO2 96%   Breastfeeding No   BMI 30.70 kg/m²   Wt Readings from Last 3 Encounters:   04/04/22 190 lb 3.2 oz (86.3 kg)   06/01/21 203 lb 6.4 oz (92.3 kg)   03/01/21 201 lb 12.8 oz (91.5 kg)     Constitutional: Well-developed, appears stated age, cooperative, in no acute distress  H/E/N/M/T:atraumatic, normocephalic, external ears, nose, lips normal without lesions  Eyes: Lids, lashes, conjunctivae and sclerae normal, No proptosis, no redness  Neck: supple, symmetrical, no swelling  Skin: No obvious rashes or lesions present. Skin and hair texture normal  Psychiatric: Judgement and Insight:  judgement and insight appear normal  Neuro: Normal without focal findings, speech is normal normal, speech is spontaneous  Chest: No labored breathing, no chest deformity, no stridor  Musculoskeletal: No joint deformity, swelling    Lab Reviewed   No components found for: CHLPL  Lab Results   Component Value Date    TRIG 149 08/30/2011     Lab Results   Component Value Date    HDL 59 08/30/2011     Lab Results   Component Value Date    LDLCALC 224 (H) 08/30/2011     No results found for: LABVLDL  Lab Results   Component Value Date    LABA1C 8.1 06/01/2021       Assessment:     Randal Kunz is a 74 y.o. female with :    1.DM : Secondary to pancreatectomy per records. On metformin. A1c  high.  Discussed goals, risk of complications, cost is an issue with other medications, added low dose glipizide. Advised with weight loss and diet changes, may be able to decrease medication  Glipizide stopped by PCP due to concern for low. Now back on it. Advised patient to start on januvia as glucose higher , she was concerned about cost. Check  Nesina cost   increase glipizide dose but may have more risk of hypoglycemia  Discussed insulin, she wants to avoid. She states will work on diet changes. May start next visit  2. HTN : Blood pressure at goal  3. HLD ; LDL at goal  4. Obesity: Advised weight loss  5. Thyroid Nodule : 1.6cm spongiform nodule, monitor. Check TSH  6.Neuropathy: Mild, monitor. Add B12  7. Dizziness: See PCP, check hormones. Advised may need to see ENT or Neurology  8. Empty sella; Check cortisol and TFT    Plan:      Metformin     glipizide 5mg daily   Nesina   Advised may need insulin   Advised to check blood sugar 4 times a day   Patient to send blood sugar log for titration. Advise to exercise regularly, Advise to low simple carbohydrate and protein with each  meal diet. Diabetes Care: recommend yearly eye exam, foot exam and urine microalbumin to   creatinine ratio.

## 2022-04-06 ENCOUNTER — TELEPHONE (OUTPATIENT)
Dept: ENDOCRINOLOGY | Age: 67
End: 2022-04-06

## 2022-04-06 NOTE — TELEPHONE ENCOUNTER
Patient reports she is not eligible to use [employer]. She spoke to [facility] and they told her that Januvia would be cheaper than Nesina. She is asking if it would be an acceptable change. If so can we send a 90 day supply of Januvia to them and a 10 day supply to [facility] on [address]?

## 2022-04-08 ENCOUNTER — TELEPHONE (OUTPATIENT)
Dept: ENDOCRINOLOGY | Age: 67
End: 2022-04-08

## 2022-04-08 NOTE — TELEPHONE ENCOUNTER
Patient is advising she is having a difficult time with glipizide. Pharmacies are advising either unavailable or too expensive. She has been [facility] with [facility], [facility], and a mail order. She is calling for other alternatives or help. She is not using glipizide at present time. She needs an alternative.       Please call  [phone]

## 2022-04-11 ENCOUNTER — TELEPHONE (OUTPATIENT)
Dept: ENDOCRINOLOGY | Age: 67
End: 2022-04-11

## 2022-04-11 NOTE — TELEPHONE ENCOUNTER
Patient states she has called several times and has had no resolution with getting her medication. She states that she has been going through this for over a week and needs to get the alternative medication for the glipizide which was originally not covered. She is requesting a call back.

## 2022-04-12 RX ORDER — GLIMEPIRIDE 4 MG/1
4 TABLET ORAL EVERY MORNING
Qty: 90 TABLET | Refills: 3 | Status: SHIPPED | OUTPATIENT
Start: 2022-04-12 | End: 2022-08-24 | Stop reason: ALTCHOICE

## 2022-04-12 RX ORDER — GLIMEPIRIDE 4 MG/1
4 TABLET ORAL EVERY MORNING
Qty: 10 TABLET | Refills: 0 | Status: SHIPPED | OUTPATIENT
Start: 2022-04-12 | End: 2022-08-24 | Stop reason: ALTCHOICE

## 2022-04-16 DIAGNOSIS — E23.6 EMPTY SELLA (HCC): ICD-10-CM

## 2022-04-16 DIAGNOSIS — E13.9 DM (DIABETES MELLITUS), SECONDARY (HCC): ICD-10-CM

## 2022-04-16 LAB
CORTISOL - AM: 13.5 UG/DL (ref 4.3–22.4)
T4 FREE: 1 NG/DL (ref 0.9–1.8)
TSH REFLEX: 1.17 UIU/ML (ref 0.27–4.2)

## 2022-04-19 ENCOUNTER — OFFICE VISIT (OUTPATIENT)
Dept: ORTHOPEDIC SURGERY | Age: 67
End: 2022-04-19
Payer: COMMERCIAL

## 2022-04-19 ENCOUNTER — TELEPHONE (OUTPATIENT)
Dept: ENDOCRINOLOGY | Age: 67
End: 2022-04-19

## 2022-04-19 VITALS — WEIGHT: 193 LBS | BODY MASS INDEX: 31.02 KG/M2 | HEIGHT: 66 IN

## 2022-04-19 DIAGNOSIS — M17.0 OSTEOARTHRITIS OF BOTH KNEES, UNSPECIFIED OSTEOARTHRITIS TYPE: Primary | ICD-10-CM

## 2022-04-19 LAB — ADRENOCORTICOTROPIC HORMONE: 28 PG/ML (ref 6–58)

## 2022-04-19 PROCEDURE — 20610 DRAIN/INJ JOINT/BURSA W/O US: CPT | Performed by: ORTHOPAEDIC SURGERY

## 2022-04-19 PROCEDURE — 99214 OFFICE O/P EST MOD 30 MIN: CPT | Performed by: ORTHOPAEDIC SURGERY

## 2022-04-19 RX ORDER — TRIAMCINOLONE ACETONIDE 40 MG/ML
40 INJECTION, SUSPENSION INTRA-ARTICULAR; INTRAMUSCULAR ONCE
Status: COMPLETED | OUTPATIENT
Start: 2022-04-19 | End: 2022-04-19

## 2022-04-19 RX ORDER — LIDOCAINE HYDROCHLORIDE 10 MG/ML
40 INJECTION, SOLUTION INFILTRATION; PERINEURAL ONCE
Status: COMPLETED | OUTPATIENT
Start: 2022-04-19 | End: 2022-04-19

## 2022-04-19 RX ADMIN — TRIAMCINOLONE ACETONIDE 40 MG: 40 INJECTION, SUSPENSION INTRA-ARTICULAR; INTRAMUSCULAR at 16:30

## 2022-04-19 RX ADMIN — LIDOCAINE HYDROCHLORIDE 40 MG: 10 INJECTION, SOLUTION INFILTRATION; PERINEURAL at 16:29

## 2022-04-19 RX ADMIN — LIDOCAINE HYDROCHLORIDE 40 MG: 10 INJECTION, SOLUTION INFILTRATION; PERINEURAL at 16:27

## 2022-04-19 NOTE — PROGRESS NOTES
CHIEF COMPLAINT: Bilateral knee pain/ osteoarthritis. HISTORY:  Ms. Chiki Stacy 74 y.o.  female presents today for the first visit for evaluation of bilateral knee pain which started to worsen over the past few days, but her knees have been bothering her for the past greater than 5 years. She is complaining of achy pain. Pain is increase with standing and walking and decrease with rest. Pain is sharp early in the morning with first few steps, dull achy pain by the end of the day. She rates her pain a 6/10 VAS and worsening. Alleviating factors: rest. No radiation and no numbness and tingling sensation. No other complaint. No h/o trauma or gout. She is previously seen us for her knees in July 2020 and had NSAIDs with some improvement. She did have bilateral knee cortisone injections on 4/27/2016 with good improvement. Denies smoking. She is well-known to me for a right trigger thumb release.     Past Medical History:   Diagnosis Date    Asthma     Cataract     Diabetes mellitus ([facility] [address]     High blood pressure        Past Surgical History:   Procedure Laterality Date    ENDOMETRIAL ABLATION      HYSTERECTOMY         Social History     Socioeconomic History    Marital status: Single     Spouse name: Not on file    Number of children: Not on file    Years of education: Not on file    Highest education level: Not on file   Occupational History    Occupation: cook/manager   Tobacco Use    Smoking status: Never Smoker    Smokeless tobacco: Never Used   Substance and Sexual Activity    Alcohol use: No    Drug use: No    Sexual activity: Not on file   Other Topics Concern    Not on file   Social History Narrative    Not on file     Social Determinants of Health     Financial Resource Strain:     Difficulty of Paying Living Expenses: Not on file   Food Insecurity:     Worried About Running Out of Food in the Last Year: Not on file    John of Food in the Last Year: Not on file Transportation Needs:     Lack of Transportation (Medical): Not on file    Lack of Transportation (Non-Medical):  Not on file   Physical Activity:     Days of Exercise per Week: Not on file    Minutes of Exercise per Session: Not on file   Stress:     Feeling of Stress : Not on file   Social Connections:     Frequency of Communication with Friends and Family: Not on file    Frequency of Social Gatherings with Friends and Family: Not on file    Attends Scientologist Services: Not on file    Active Member of [address] [place] [address] or Organizations: Not on file    Attends Club or Organization Meetings: Not on file    Marital Status: Not on file   Intimate Partner Violence:     Fear of Current or Ex-Partner: Not on file    Emotionally Abused: Not on file    Physically Abused: Not on file    Sexually Abused: Not on file   Housing Stability:     Unable to Pay for Housing in the Last Year: Not on file    Number of [place] in the Last Year: Not on file    Unstable Housing in the Last Year: Not on file       Family History   Problem Relation Age of Onset    Diabetes Other     High Blood Pressure Other     Kidney Disease Other        Current Outpatient Medications on File Prior to Visit   Medication Sig Dispense Refill    SITagliptin (JANUVIA) 100 MG tablet Take 1 tablet by mouth daily 10 tablet 0    glimepiride (AMARYL) 4 MG tablet Take 1 tablet by mouth every morning 90 tablet 3    glimepiride (AMARYL) 4 MG tablet Take 1 tablet by mouth every morning 10 tablet 0    methylPREDNISolone (MEDROL DOSEPACK) 4 MG tablet TAKE BY MOUTH AS DIRECTED      sertraline (ZOLOFT) 50 MG tablet TAKE 1 TABLET BY MOUTH ONCE DAILY      sucralfate (CARAFATE) 1 GM tablet Take 1 g by mouth 4 times daily (before meals and nightly)      tiZANidine (ZANAFLEX) 2 MG tablet Take 2 mg by mouth every 8 hours as needed      azelastine (OPTIVAR) 0.05 % ophthalmic solution       atorvastatin (LIPITOR) 80 MG tablet       azelastine (ASTELIN) 0.1 % nasal spray 2 sprays by Nasal route 2 times daily      prednisoLONE acetate (PRED FORTE) 1 % ophthalmic suspension INSTILL 1 DROP INTO EACH EYE 4 TIMES DAILY      pramipexole (MIRAPEX) 0.5 MG tablet TAKE 1 TABLET BY MOUTH ONCE DAILY AT BEDTIME FOR 60 DAYS      metFORMIN (GLUCOPHAGE) 500 MG tablet       losartan (COZAAR) 100 MG tablet       linagliptin (TRADJENTA) 5 MG tablet Take 1 tablet by mouth daily 90 tablet 1    hydrOXYzine (ATARAX) 25 MG tablet Take 25 mg by mouth      doxycycline hyclate (VIBRAMYCIN) 100 MG capsule Take 100 mg by mouth 2 times daily      sertraline (ZOLOFT) 25 MG tablet Take 25 mg by mouth daily      APPLE CIDER VINEGAR PO Take by mouth      Bromelains (BROMELAIN PO) Take by mouth      Famotidine (PEPCID AC PO) Take by mouth      amLODIPine (NORVASC) 10 MG tablet       lovastatin (ALTOPREV) 20 MG ER tablet Take 20 mg by mouth nightly.  FOLIC ACID by Does not apply route.  Ascorbic Acid (VITAMIN C) 500 MG tablet Take 500 mg by mouth daily. No current facility-administered medications on file prior to visit. Pertinent items are noted in HPI  Review of systems reviewed from Patient History Form and available in the patient's chart under the Media tab. PHYSICAL EXAMINATION:  Ms. Shania Erazo is a very pleasant 74 y.o.  female who presents today in no acute distress, awake, alert, and oriented. She is well dressed, nourished and  groomed. Patient with normal affect. Height is  5' 6\" (1.676 m), weight is 193 lb (87.5 kg), Body mass index is 31.15 kg/m². Resting respiratory rate is 16. Examination of the gait, showed that the patient walks heel-toe with a non-antalgic gait and no limp. Examination of both knees showing full ROM, bilateral mild crepitus, tenderness on medial joint line, stable to varus and valgus stress. She has intact sensation and good pedal pulses.  She has good strength in 2 planes, and has mild tenderness on deep palpation over the medial joint line. Knee reflex 1+ bilaterally. IMAGING:  Xray 3 views of the bilateral knee was obtained today in the office and reviewed. These demonstrate moderate degenerative changes with narrowing of the joint space in the medial joint space compartment, subchondral sclerosis, and marginal osteophytosis. IMPRESSION: Bilateral knee DJD. PLAN: I discussed with the patient the treatment options including both surgical and non-surgical treatment. We recommended Quad exercises and stretching of the calf and hamstrings which was taught to the patient today. She will take NSAIDS as needed. I believe she will benefit from cortisone injection bilateral knee, and she agreed to have. PROCEDURE:    With the patient's permission, and under sterile condition, the bilateral knee was prepared and draped with alcohol and injected with a mixture of 1 mL Kenalog 40mg and 4 mL of 1% lidocaine through the medial parapatellar port area. The patient tolerated the procedure well. A band-aid was applied and the patient was advised to ice the knee for 15-20 minutes to relieve any injection site related pain. She reported a good improvement immediatly after the injection. F/u in 3-4 months, PT if needed. She understands that this may need surgery if the pain did not to resolve.        Masood Siddiqui MD

## 2022-04-19 NOTE — TELEPHONE ENCOUNTER
Please advise the cortisol and thyroid level are normal. The labs did not indicate any hormonal abnormality

## 2022-04-22 ENCOUNTER — TELEPHONE (OUTPATIENT)
Dept: ENDOCRINOLOGY | Age: 67
End: 2022-04-22

## 2022-04-22 NOTE — TELEPHONE ENCOUNTER
Pt called and states she's out of her Januvia as of today. She would like a rx sent to [facility] on [address] [place] [address] and another sent to [facility]. Pt did states it needs a Prior Auth (sent separate message to PA dept) to do PA  Pt wants a call back from the nurse. Pt thought  PA were from only certain pharmacies and had to be done separately.  Instructed to pt its a general PA not per pharmacy    # [phone]

## 2022-04-26 NOTE — TELEPHONE ENCOUNTER
Medication:   Requested Prescriptions     Pending Prescriptions Disp Refills    SITagliptin (JANUVIA) 100 MG tablet 90 tablet 1     Sig: Take 1 tablet by mouth daily       Last Filled:      Patient Phone Number: [phone] (home)     Last appt: 4/4/2022   Next appt: 5/18/2022    Last Labs DM:   Lab Results   Component Value Date    LABA1C 9.4 04/04/2022

## 2022-05-04 ENCOUNTER — TELEPHONE (OUTPATIENT)
Dept: ENDOCRINOLOGY | Age: 67
End: 2022-05-04

## 2022-05-09 DIAGNOSIS — E13.9 DM (DIABETES MELLITUS), SECONDARY (HCC): Primary | ICD-10-CM

## 2022-05-09 RX ORDER — GLIPIZIDE 2.5 MG/1
TABLET, EXTENDED RELEASE ORAL
Qty: 30 TABLET | Refills: 3 | Status: SHIPPED | OUTPATIENT
Start: 2022-05-09

## 2022-05-09 NOTE — TELEPHONE ENCOUNTER
Medication:   Requested Prescriptions     Pending Prescriptions Disp Refills    glipiZIDE (GLUCOTROL XL) 2.5 MG extended release tablet [Pharmacy Med Name: glipiZIDE ER 2.5 MG Oral Tablet Extended Release 24 Hour] 30 tablet 0     Sig: Take 1 tablet by mouth once daily       Last Filled:      Patient Phone Number: [phone] (home)     Last appt: 4/4/22   Next appt: 5/18/22    Last Labs DM:   Lab Results   Component Value Date    LABA1C 9.4 04/04/2022

## 2022-05-18 ENCOUNTER — OFFICE VISIT (OUTPATIENT)
Dept: ENDOCRINOLOGY | Age: 67
End: 2022-05-18
Payer: COMMERCIAL

## 2022-05-18 VITALS
DIASTOLIC BLOOD PRESSURE: 56 MMHG | WEIGHT: 190.2 LBS | OXYGEN SATURATION: 95 % | BODY MASS INDEX: 30.57 KG/M2 | HEART RATE: 78 BPM | SYSTOLIC BLOOD PRESSURE: 99 MMHG | HEIGHT: 66 IN

## 2022-05-18 DIAGNOSIS — E11.9 CONTROLLED TYPE 2 DIABETES MELLITUS WITHOUT COMPLICATION, WITHOUT LONG-TERM CURRENT USE OF INSULIN (HCC): Primary | ICD-10-CM

## 2022-05-18 DIAGNOSIS — I10 PRIMARY HYPERTENSION: ICD-10-CM

## 2022-05-18 PROCEDURE — 3046F HEMOGLOBIN A1C LEVEL >9.0%: CPT | Performed by: INTERNAL MEDICINE

## 2022-05-18 PROCEDURE — 99214 OFFICE O/P EST MOD 30 MIN: CPT | Performed by: INTERNAL MEDICINE

## 2022-05-18 NOTE — PROGRESS NOTES
Seen as  patient for diabetes    Interim:    Glucose better  Januvia costly    Partially empty sella which can be associated with hormonal abnormalities such as panhypopituitarism and hypothyroidism. 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)  NET per records, not malignant    Known diabetic complications: none   Uncontrolled, moderate    Current diabetic medications     Metformin 1gm BID  Glipizide 2.5mg daily  Januvia 100mg- costly    tradjenta costly    H/o glimepiride   [facility] [place] costly  States needs medication which is cheaper    Last A1c 7.7%<--- 9.4%<------8.6%<-----8.1%<-----6.1%<-----7.6%<----- 7.1% <--- 6.9 <--- 8.3    Prior visit with dietician: Yes   Current diet: on average, 3 meals per day   Current exercise: walking   Current monitoring regimen: home blood tests -1-2   times daily     Has brought blood glucose log/meter: yes  Home blood sugar records:  Any episodes of hypoglycemia? occ in 60s  Worsened by high CHO    No Hx of CAD , PVD, CVA    Hyperlipidemia:   For   Years  Takes lipitor 40mg  Controlled  LDL 71 on 9/20    Hypertension for years  Stable  Takes norvasc 10mg losartan 50mg      Last eye exam: 5/20  Last foot exam: 11/20  Last microalbumin to creatinine ratio: 10/19    B. Subtotal distal pancreatectomy/splenectomy:  - Well-differentiated pancreatic neuroendocrine neoplasm.  - See microscopic description.  - Eleven peripancreatic and 1 splenic hilar lymph nodes (0/12). - Chronic pancreatitis with islet cell hyperplasia. - Spleen without histopathologic abnormality. She has a h/o toxic nodule per record but euthyroid    8/11 thyroid scan         Impression:         1. Hyperfunctioning nodule within the inferior portion of the right lobe   of       the thyroid.       2.  The 24-hour thyroid uptake is within normal range suggesting a euthyroid    TSH 1.14  On 7/20  0.43 on 5/11 12/20  Right thyroid lobe:  4.2 x 1.8 x 1.6 cm     Left thyroid lobe:  3.8 x 1.0 x 1.0 cm     Isthmus:  0.4 cm     Thyroid Gland:  Thyroid gland demonstrates normal echotexture and vascularity.     Nodules:     NODULE: Right 1     Size: 16 x 12 x 10 mm    Spongiform, monitor    Does c/o tingling in big toes, h/o mild neuropathy per records    Past Medical History:   Diagnosis Date    Asthma     Cataract     [facility] mellitus ([facility] [address]     High blood pressure      Past Surgical History:   Procedure Laterality Date    ENDOMETRIAL ABLATION      HYSTERECTOMY       Current Outpatient Medications   Medication Sig Dispense Refill    glipiZIDE (GLUCOTROL XL) 2.5 MG extended release tablet Take 1 tablet by mouth once daily 30 tablet 3    SITagliptin (JANUVIA) 100 MG tablet Take 1 tablet by mouth daily 90 tablet 1    sertraline (ZOLOFT) 50 MG tablet TAKE 1 TABLET BY MOUTH ONCE DAILY      sucralfate (CARAFATE) 1 GM tablet Take 1 g by mouth 4 times daily (before meals and nightly)      tiZANidine (ZANAFLEX) 2 MG tablet Take 2 mg by mouth every 8 hours as needed      azelastine (OPTIVAR) 0.05 % ophthalmic solution       atorvastatin (LIPITOR) 80 MG tablet       azelastine (ASTELIN) 0.1 % nasal spray 2 sprays by Nasal route 2 times daily      prednisoLONE acetate (PRED FORTE) 1 % ophthalmic suspension INSTILL 1 DROP INTO EACH EYE 4 TIMES DAILY      pramipexole (MIRAPEX) 0.5 MG tablet TAKE 1 TABLET BY MOUTH ONCE DAILY AT BEDTIME FOR 60 DAYS      metFORMIN (GLUCOPHAGE) 500 MG tablet       losartan (COZAAR) 100 MG tablet       linagliptin (TRADJENTA) 5 MG tablet Take 1 tablet by mouth daily 90 tablet 1    hydrOXYzine (ATARAX) 25 MG tablet Take 25 mg by mouth      doxycycline hyclate (VIBRAMYCIN) 100 MG capsule Take 100 mg by mouth 2 times daily      sertraline (ZOLOFT) 25 MG tablet Take 25 mg by mouth daily      APPLE CIDER VINEGAR PO Take by mouth      Bromelains (BROMELAIN PO) Take by mouth      Famotidine (PEPCID AC PO) Take by mouth      amLODIPine (NORVASC) 10 MG tablet       lovastatin (ALTOPREV) 20 MG ER tablet Take 20 mg by mouth nightly.  FOLIC ACID by Does not apply route.  Ascorbic Acid (VITAMIN C) 500 MG tablet Take 500 mg by mouth daily.  glimepiride (AMARYL) 4 MG tablet Take 1 tablet by mouth every morning 90 tablet 3    glimepiride (AMARYL) 4 MG tablet Take 1 tablet by mouth every morning 10 tablet 0    methylPREDNISolone (MEDROL DOSEPACK) 4 MG tablet TAKE BY MOUTH AS DIRECTED       No current facility-administered medications for this visit. Review of Systems  Please see scanned document dated and signed      Objective:      BP (!) 99/56   Pulse 78   Ht 5' 6\" (1.676 m)   Wt 190 lb 3.2 oz (86.3 kg)   SpO2 95%   Breastfeeding No   BMI 30.70 kg/m²   Wt Readings from Last 3 Encounters:   05/18/22 190 lb 3.2 oz (86.3 kg)   04/19/22 193 lb (87.5 kg)   04/04/22 190 lb 3.2 oz (86.3 kg)     Constitutional: Well-developed, appears stated age, cooperative, in no acute distress  H/E/N/M/T:atraumatic, normocephalic, external ears, nose, lips normal without lesions  Eyes: Lids, lashes, conjunctivae and sclerae normal, No proptosis, no redness  Neck: supple, symmetrical, no swelling  Skin: No obvious rashes or lesions present.   Skin and hair texture normal  Psychiatric: Judgement and Insight:  judgement and insight appear normal  Neuro: Normal without focal findings, speech is normal normal, speech is spontaneous  Chest: No labored breathing, no chest deformity, no stridor  Musculoskeletal: No joint deformity, swelling    Lab Reviewed   No components found for: CHLPL  Lab Results   Component Value Date    TRIG 149 08/30/2011     Lab Results   Component Value Date    HDL 59 08/30/2011     Lab Results   Component Value Date    LDLCALC 224 (H) 08/30/2011     No results found for: LABVLDL  Lab Results   Component Value Date    LABA1C 9.4 04/04/2022       Assessment:     Jeana Albert is a 74 y.o. female with :    1.DM : Secondary to pancreatectomy per records. On metformin. A1c was  high. Discussed goals, risk of complications, cost is an issue with other medications, added low dose glipizide. Advised with weight loss and diet changes, may be able to decrease medication  Glipizide stopped by PCP due to concern for low. Now back on it. Advised patient to start on januvia as glucose higher , she was concerned about cost, is taking now. Darlene Chain is also costly. increase glipizide dose but may have more risk of hypoglycemia  Discussed insulin, she wants to avoid. She states will work on diet changes. Check cost of jardiance, farxiga, tradjenta  Discussed Actos, she would like to avoid due to weight gain and swelling with it  She would like to de escalate therapy if A1c improves  2. HTN : Blood pressure at goal  3. HLD ; LDL at goal  4. Obesity: Advised weight loss  5. Thyroid Nodule : 1.6cm spongiform nodule, monitor. Check TSH  6.Neuropathy: Mild, monitor. Add B12  7. Dizziness: See PCP,nl  hormones. Advised may need to see ENT or Neurology  8. Empty sella; No  cortisol and TFT    Plan:      Metformin     glipizide 2.5mg daily   Januvia   Advised may need insulin   Advised to check blood sugar 4 times a day   Patient to send blood sugar log for titration. Advise to exercise regularly, Advise to low simple carbohydrate and protein with each  meal diet. Diabetes Care: recommend yearly eye exam, foot exam and urine microalbumin to   creatinine ratio.

## 2022-08-24 ENCOUNTER — OFFICE VISIT (OUTPATIENT)
Dept: ENDOCRINOLOGY | Age: 67
End: 2022-08-24
Payer: MEDICARE

## 2022-08-24 VITALS
BODY MASS INDEX: 30.18 KG/M2 | HEIGHT: 66 IN | HEART RATE: 68 BPM | SYSTOLIC BLOOD PRESSURE: 134 MMHG | WEIGHT: 187.8 LBS | DIASTOLIC BLOOD PRESSURE: 76 MMHG | OXYGEN SATURATION: 96 %

## 2022-08-24 DIAGNOSIS — E11.9 CONTROLLED TYPE 2 DIABETES MELLITUS WITHOUT COMPLICATION, WITHOUT LONG-TERM CURRENT USE OF INSULIN (HCC): Primary | ICD-10-CM

## 2022-08-24 LAB
CREATININE URINE: 269.2 MG/DL (ref 28–259)
HBA1C MFR BLD: 5.4 %
MICROALBUMIN UR-MCNC: <1.2 MG/DL
MICROALBUMIN/CREAT UR-RTO: ABNORMAL MG/G (ref 0–30)

## 2022-08-24 PROCEDURE — 83036 HEMOGLOBIN GLYCOSYLATED A1C: CPT | Performed by: INTERNAL MEDICINE

## 2022-08-24 PROCEDURE — 3044F HG A1C LEVEL LT 7.0%: CPT | Performed by: INTERNAL MEDICINE

## 2022-08-24 PROCEDURE — 1123F ACP DISCUSS/DSCN MKR DOCD: CPT | Performed by: INTERNAL MEDICINE

## 2022-08-24 PROCEDURE — 99214 OFFICE O/P EST MOD 30 MIN: CPT | Performed by: INTERNAL MEDICINE

## 2022-08-24 NOTE — PROGRESS NOTES
Seen as  patient for diabetes    Interim:    Glucose better  Eating better    Partially empty sella which can be associated with hormonal abnormalities such as panhypopituitarism and hypothyroidism. 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)  NET per records, not malignant    Known diabetic complications: none   Uncontrolled, moderate    Current diabetic medications     Metformin 1gm BID  Glipizide 2.5mg daily  Januvia 100mg- costly    tradjenta costly    H/o glimepiride   [facility] [place] costly  States needs medication which is cheaper    Last A1c 5.4%<-------7.7%<--- 9.4%<------8.6%<-----8.1%<-----6.1%<-----7.6%<----- 7.1% <--- 6.9 <--- 8.3    Prior visit with dietician: Yes   Current diet: on average, 3 meals per day   Current exercise: walking   Current monitoring regimen: home blood tests -1-2   times daily     Has brought blood glucose log/meter: yes  Home blood sugar records:  Any episodes of hypoglycemia? occ in 60s  Worsened by high CHO    No Hx of CAD , PVD, CVA    Hyperlipidemia:   For   Years  Takes lipitor 40mg  Controlled  LDL 71 on 9/20    Hypertension for years  Stable  Takes norvasc 10mg losartan 50mg      Last eye exam: 5/20  Last foot exam: 11/20  Last microalbumin to creatinine ratio: 6/21    B. Subtotal distal pancreatectomy/splenectomy:  - Well-differentiated pancreatic neuroendocrine neoplasm.  - See microscopic description.  - Eleven peripancreatic and 1 splenic hilar lymph nodes (0/12). - Chronic pancreatitis with islet cell hyperplasia. - Spleen without histopathologic abnormality. She has a h/o toxic nodule per record but euthyroid    8/11 thyroid scan         Impression:         1. Hyperfunctioning nodule within the inferior portion of the right lobe   of       the thyroid.        2. The 24-hour thyroid uptake is within normal range suggesting a euthyroid    TSH 1.14  On 7/20  0.43 on 5/11 12/20  Right thyroid lobe:  4.2 x 1.8 x 1.6 cm     Left thyroid lobe:  3.8 x 1.0 x 1.0 cm     Isthmus:  0.4 cm     Thyroid Gland:  Thyroid gland demonstrates normal echotexture and vascularity.      Nodules:     NODULE: Right 1     Size: 16 x 12 x 10 mm    Spongiform, monitor    Does c/o tingling in big toes, h/o mild neuropathy per records    Past Medical History:   Diagnosis Date    Asthma     Cataract     Diabetes mellitus ([facility] [address]     High blood pressure      Past Surgical History:   Procedure Laterality Date    ENDOMETRIAL ABLATION      HYSTERECTOMY       Current Outpatient Medications   Medication Sig Dispense Refill    glipiZIDE (GLUCOTROL XL) 2.5 MG extended release tablet Take 1 tablet by mouth once daily 30 tablet 3    SITagliptin (JANUVIA) 100 MG tablet Take 1 tablet by mouth daily 90 tablet 1    sertraline (ZOLOFT) 50 MG tablet TAKE 1 TABLET BY MOUTH ONCE DAILY      sucralfate (CARAFATE) 1 GM tablet Take 1 g by mouth 4 times daily (before meals and nightly)      tiZANidine (ZANAFLEX) 2 MG tablet Take 2 mg by mouth every 8 hours as needed      azelastine (OPTIVAR) 0.05 % ophthalmic solution       atorvastatin (LIPITOR) 80 MG tablet       azelastine (ASTELIN) 0.1 % nasal spray 2 sprays by Nasal route 2 times daily      prednisoLONE acetate (PRED FORTE) 1 % ophthalmic suspension INSTILL 1 DROP INTO EACH EYE 4 TIMES DAILY      pramipexole (MIRAPEX) 0.5 MG tablet TAKE 1 TABLET BY MOUTH ONCE DAILY AT BEDTIME FOR 60 DAYS      metFORMIN (GLUCOPHAGE) 500 MG tablet       losartan (COZAAR) 100 MG tablet       linagliptin (TRADJENTA) 5 MG tablet Take 1 tablet by mouth daily 90 tablet 1    hydrOXYzine (ATARAX) 25 MG tablet Take 25 mg by mouth      doxycycline hyclate (VIBRAMYCIN) 100 MG capsule Take 100 mg by mouth 2 times daily      sertraline (ZOLOFT) 25 MG tablet Take 25 mg by mouth daily      APPLE CIDER VINEGAR PO Take by mouth      Bromelains (BROMELAIN PO) Take by mouth      Famotidine (PEPCID AC PO) Take by mouth      amLODIPine (NORVASC) 10 MG tablet       lovastatin (ALTOPREV) 20 MG ER tablet Take 20 mg by mouth nightly. FOLIC ACID by Does not apply route. Ascorbic Acid (VITAMIN C) 500 MG tablet Take 500 mg by mouth daily. No current facility-administered medications for this visit. Review of Systems  Please see scanned document dated and signed      Objective:      /76   Pulse 68   Ht 5' 6\" (1.676 m)   Wt 187 lb 12.8 oz (85.2 kg)   SpO2 96%   BMI 30.31 kg/m²   Wt Readings from Last 3 Encounters:   08/24/22 187 lb 12.8 oz (85.2 kg)   05/18/22 190 lb 3.2 oz (86.3 kg)   04/19/22 193 lb (87.5 kg)     Constitutional: Well-developed, appears stated age, cooperative, in no acute distress  H/E/N/M/T:atraumatic, normocephalic, external ears, nose, lips normal without lesions  Eyes: Lids, lashes, conjunctivae and sclerae normal, No proptosis, no redness  Neck: supple, symmetrical, no swelling  Skin: No obvious rashes or lesions present. Skin and hair texture normal  Psychiatric: Judgement and Insight:  judgement and insight appear normal  Neuro: Normal without focal findings, speech is normal normal, speech is spontaneous  Chest: No labored breathing, no chest deformity, no stridor  Musculoskeletal: No joint deformity, swelling    Foot exam:Monofilament detected, no ulcers    Lab Reviewed   No components found for: CHLPL  Lab Results   Component Value Date    TRIG 149 08/30/2011     Lab Results   Component Value Date    HDL 59 08/30/2011     Lab Results   Component Value Date    LDLCALC 224 (H) 08/30/2011     No results found for: LABVLDL  Lab Results   Component Value Date    LABA1C 9.4 04/04/2022       Assessment:     Janelle Madrid is a 74 y.o. female with :    1.DM : Secondary to pancreatectomy per records. On metformin. A1c was  high. Discussed goals, risk of complications, cost is an issue with other medications, added low dose glipizide. Advised with weight loss and diet changes, may be able to decrease medication  Glipizide stopped by PCP due to concern for low. Now back on it. Advised patient to start on januvia as glucose higher , she was concerned about cost, is taking now. Qian Solo is also costly. increase glipizide dose but may have more risk of hypoglycemia  Discussed insulin, she wants to avoid. She states will work on diet changes. Check cost of jardiance, farxiga  Discussed Actos, she would like to avoid due to weight gain and swelling with it  She would like to de escalate therapy if A1c improves  2. HTN : Blood pressure at goal  3. HLD ; LDL at goal  4. Obesity: Advised weight loss  5. Thyroid Nodule : 1.6cm spongiform nodule, monitor. Check TSH  6.Neuropathy: Mild, monitor. Add B12  7. Dizziness: See PCP,nl  hormones. Advised may need to see ENT or Neurology  8. Empty sella; No  cortisol and TFT    Plan:      Metformin     glipizide 2.5mg daily   Januvia   Advised may need insulin   Advised to check blood sugar 4 times a day   Patient to send blood sugar log for titration. Advise to exercise regularly, Advise to low simple carbohydrate and protein with each  meal diet. Diabetes Care: recommend yearly eye exam, foot exam and urine microalbumin to   creatinine ratio.

## 2022-12-14 ENCOUNTER — OFFICE VISIT (OUTPATIENT)
Dept: ENDOCRINOLOGY | Age: 67
End: 2022-12-14
Payer: MEDICARE

## 2022-12-14 VITALS
BODY MASS INDEX: 31.18 KG/M2 | TEMPERATURE: 98 F | DIASTOLIC BLOOD PRESSURE: 86 MMHG | HEIGHT: 66 IN | RESPIRATION RATE: 14 BRPM | WEIGHT: 194 LBS | SYSTOLIC BLOOD PRESSURE: 138 MMHG | HEART RATE: 73 BPM

## 2022-12-14 DIAGNOSIS — E04.1 THYROID NODULE: ICD-10-CM

## 2022-12-14 DIAGNOSIS — E11.9 CONTROLLED TYPE 2 DIABETES MELLITUS WITHOUT COMPLICATION, WITHOUT LONG-TERM CURRENT USE OF INSULIN (HCC): Primary | ICD-10-CM

## 2022-12-14 LAB — HBA1C MFR BLD: 5.7 %

## 2022-12-14 PROCEDURE — 99214 OFFICE O/P EST MOD 30 MIN: CPT | Performed by: INTERNAL MEDICINE

## 2022-12-14 PROCEDURE — 3044F HG A1C LEVEL LT 7.0%: CPT | Performed by: INTERNAL MEDICINE

## 2022-12-14 PROCEDURE — 83036 HEMOGLOBIN GLYCOSYLATED A1C: CPT | Performed by: INTERNAL MEDICINE

## 2022-12-14 PROCEDURE — 1123F ACP DISCUSS/DSCN MKR DOCD: CPT | Performed by: INTERNAL MEDICINE

## 2022-12-14 NOTE — PROGRESS NOTES
Seen as  patient for diabetes    Interim:    Out of januvia for 3 weeks  On patient assistance  Forgot reader    Partially empty sella which can be associated with hormonal abnormalities such as panhypopituitarism and hypothyroidism. 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)  NET per records, not malignant    Known diabetic complications: none   Uncontrolled, moderate    Current diabetic medications     Metformin 1gm BID  Glipizide 2.5mg daily  Januvia 100mg- costly    tradjenta costly    H/o glimepiride   [facility] [place] costly  States needs medication which is cheaper    Last A1c 5.7%<-----5.4%<-------7.7%<--- 9.4%<------8.6%<-----8.1%<-----6.1%<-----7.6%<----- 7.1% <--- 6.9 <--- 8.3    Prior visit with dietician: Yes   Current diet: on average, 3 meals per day   Current exercise: walking   Current monitoring regimen: home blood tests -1-2   times daily     Has brought blood glucose log/meter: no  Home blood sugar records:100s  Any episodes of hypoglycemia? occ in 60s  Worsened by high CHO    No Hx of CAD , PVD, CVA    Hyperlipidemia:   For   Years  Takes lipitor 40mg  Controlled  LDL 71 on 9/20    Hypertension for years  Stable  Takes norvasc 10mg losartan 50mg      Last eye exam: 5/22  Last foot exam: 12/22  Last microalbumin to creatinine ratio: 8/22    B. Subtotal distal pancreatectomy/splenectomy:  - Well-differentiated pancreatic neuroendocrine neoplasm.  - See microscopic description.  - Eleven peripancreatic and 1 splenic hilar lymph nodes (0/12). - Chronic pancreatitis with islet cell hyperplasia. - Spleen without histopathologic abnormality. She has a h/o toxic nodule per record but euthyroid    8/11 thyroid scan         Impression:         1. Hyperfunctioning nodule within the inferior portion of the right lobe   of       the thyroid.        2. The 24-hour thyroid uptake is within normal range suggesting a   euthyroid    TSH 1.14  On 7/20  0.43 on 5/11 12/20  Right thyroid lobe:  4.2 x 1.8 x 1.6 cm     Left thyroid lobe:  3.8 x 1.0 x 1.0 cm     Isthmus:  0.4 cm     Thyroid Gland:  Thyroid gland demonstrates normal echotexture and vascularity.      Nodules:     NODULE: Right 1     Size: 16 x 12 x 10 mm    Spongiform, monitor    Does c/o tingling in big toes, h/o mild neuropathy per records    Past Medical History:   Diagnosis Date    Asthma     Cataract     Diabetes mellitus ([facility] [address]     High blood pressure      Past Surgical History:   Procedure Laterality Date    ENDOMETRIAL ABLATION      HYSTERECTOMY (CERVIX STATUS UNKNOWN)       Current Outpatient Medications   Medication Sig Dispense Refill    glipiZIDE (GLUCOTROL XL) 2.5 MG extended release tablet Take 1 tablet by mouth once daily 30 tablet 3    SITagliptin (JANUVIA) 100 MG tablet Take 1 tablet by mouth daily 90 tablet 1    sertraline (ZOLOFT) 50 MG tablet TAKE 1 TABLET BY MOUTH ONCE DAILY      sucralfate (CARAFATE) 1 GM tablet Take 1 g by mouth 4 times daily (before meals and nightly)      tiZANidine (ZANAFLEX) 2 MG tablet Take 2 mg by mouth every 8 hours as needed      azelastine (OPTIVAR) 0.05 % ophthalmic solution       atorvastatin (LIPITOR) 80 MG tablet       azelastine (ASTELIN) 0.1 % nasal spray 2 sprays by Nasal route 2 times daily      prednisoLONE acetate (PRED FORTE) 1 % ophthalmic suspension INSTILL 1 DROP INTO EACH EYE 4 TIMES DAILY      pramipexole (MIRAPEX) 0.5 MG tablet TAKE 1 TABLET BY MOUTH ONCE DAILY AT BEDTIME FOR 60 DAYS      metFORMIN (GLUCOPHAGE) 500 MG tablet 1,000 mg 1000 bid      losartan (COZAAR) 100 MG tablet       hydrOXYzine (ATARAX) 25 MG tablet Take 25 mg by mouth      doxycycline hyclate (VIBRAMYCIN) 100 MG capsule Take 100 mg by mouth 2 times daily      sertraline (ZOLOFT) 25 MG tablet Take 25 mg by mouth daily      APPLE CIDER VINEGAR PO Take by mouth      Bromelains (BROMELAIN PO) Take by mouth      Famotidine (PEPCID AC PO) Take by mouth      amLODIPine (NORVASC) 10 MG tablet       lovastatin (ALTOPREV) 20 MG ER tablet Take 20 mg by mouth nightly. FOLIC ACID by Does not apply route. Ascorbic Acid (VITAMIN C) 500 MG tablet Take 500 mg by mouth daily. No current facility-administered medications for this visit. Review of Systems  Please see scanned document dated and signed      Objective:      /86   Pulse 73   Temp 98 °F (36.7 °C)   Resp 14   Ht 5' 6\" (1.676 m)   Wt 194 lb (88 kg)   BMI 31.31 kg/m²   Wt Readings from Last 3 Encounters:   12/14/22 194 lb (88 kg)   08/24/22 187 lb 12.8 oz (85.2 kg)   05/18/22 190 lb 3.2 oz (86.3 kg)     Constitutional: Well-developed, appears stated age, cooperative, in no acute distress  H/E/N/M/T:atraumatic, normocephalic, external ears, nose, lips normal without lesions  Eyes: Lids, lashes, conjunctivae and sclerae normal, No proptosis, no redness  Neck: supple, symmetrical, no swelling  Skin: No obvious rashes or lesions present. Skin and hair texture normal  Psychiatric: Judgement and Insight:  judgement and insight appear normal  Neuro: Normal without focal findings, speech is normal normal, speech is spontaneous  Chest: No labored breathing, no chest deformity, no stridor  Musculoskeletal: No joint deformity, swelling    12/22  Foot exam:Monofilament detected, no ulcers    Lab Reviewed   No components found for: CHLPL  Lab Results   Component Value Date    TRIG 149 08/30/2011     Lab Results   Component Value Date    HDL 59 08/30/2011     Lab Results   Component Value Date    LDLCALC 224 (H) 08/30/2011     No results found for: LABVLDL  Lab Results   Component Value Date    LABA1C 5.4 08/24/2022       Assessment:     Benjamin Castro is a 69 y.o. female with :    1.DM : Secondary to pancreatectomy per records. On metformin. A1c was  high.  Discussed goals, risk of complications, cost is an issue with other medications, added low dose glipizide. Advised with weight loss and diet changes, may be able to decrease medication  Glipizide stopped by PCP due to concern for low. Now back on it. Advised patient to start on januvia as glucose higher , she was concerned about cost, is taking now. Jeffrey Garcia is also costly. increase glipizide dose but may have more risk of hypoglycemia  Discussed insulin, she wants to avoid. She states will work on diet changes. Check cost of jardiance, farxiga  Discussed Actos, she would like to avoid due to weight gain and swelling with it  She would like to de escalate therapy if A1c improves  A1c better, stop glipizide  2. HTN : Blood pressure at goal  3. HLD ; LDL at goal  4. Obesity: Advised weight loss  5. Thyroid Nodule : 1.6cm spongiform nodule, monitor. Normal TSH. Thyroid USG before next visit  6. Neuropathy: Mild, monitor. Add B12 , needs to start  7. Dizziness: See PCP,nl  hormones. Advised may need to see ENT or Neurology  8. Empty sella; No  cortisol and TFT    Plan:      Metformin    Stop glipizide 2.5mg daily   Januvia sample in office   If costly, will stop and restart glipizide   Advised may need insulin   Advised to check blood sugar 4 times a day   Patient to send blood sugar log for titration. Advise to exercise regularly, Advise to low simple carbohydrate and protein with each  meal diet. Diabetes Care: recommend yearly eye exam, foot exam and urine microalbumin to   creatinine ratio.

## 2023-02-13 ENCOUNTER — TELEPHONE (OUTPATIENT)
Dept: ENDOCRINOLOGY | Age: 68
End: 2023-02-13

## 2023-02-13 DIAGNOSIS — E13.9 DM (DIABETES MELLITUS), SECONDARY (HCC): ICD-10-CM

## 2023-02-13 RX ORDER — GLIPIZIDE 2.5 MG/1
TABLET, EXTENDED RELEASE ORAL
Qty: 30 TABLET | Refills: 0 | Status: SHIPPED | OUTPATIENT
Start: 2023-02-13

## 2023-02-13 NOTE — TELEPHONE ENCOUNTER
I have added back glipizide. Please advise her Given high glucose, may be better to start insulin if glucose does not improve.

## 2023-02-13 NOTE — TELEPHONE ENCOUNTER
Pt would like to speak to MA, she has been sick and has been taking a steroid, she wanted to know if she could go back to taking glipizide with [facility] [place]. Her sugars have been low 400s.     Call back # [phone]

## 2023-02-27 ENCOUNTER — HOSPITAL ENCOUNTER (OUTPATIENT)
Dept: ULTRASOUND IMAGING | Age: 68
Discharge: HOME | End: 2023-02-27
Payer: MEDICARE

## 2023-02-27 DIAGNOSIS — E11.9 CONTROLLED TYPE 2 DIABETES MELLITUS WITHOUT COMPLICATION, WITHOUT LONG-TERM CURRENT USE OF INSULIN (HCC): ICD-10-CM

## 2023-02-27 DIAGNOSIS — E04.1 THYROID NODULE: ICD-10-CM

## 2023-02-27 PROCEDURE — 76536 US EXAM OF HEAD AND NECK: CPT

## 2023-03-28 ENCOUNTER — OFFICE VISIT (OUTPATIENT)
Dept: ENDOCRINOLOGY | Age: 68
End: 2023-03-28
Payer: MEDICARE

## 2023-03-28 VITALS
OXYGEN SATURATION: 98 % | BODY MASS INDEX: 31.47 KG/M2 | SYSTOLIC BLOOD PRESSURE: 132 MMHG | DIASTOLIC BLOOD PRESSURE: 74 MMHG | WEIGHT: 195 LBS | HEART RATE: 72 BPM

## 2023-03-28 DIAGNOSIS — E11.9 CONTROLLED TYPE 2 DIABETES MELLITUS WITHOUT COMPLICATION, WITHOUT LONG-TERM CURRENT USE OF INSULIN (HCC): Primary | ICD-10-CM

## 2023-03-28 DIAGNOSIS — E04.1 THYROID NODULE: ICD-10-CM

## 2023-03-28 PROCEDURE — 99214 OFFICE O/P EST MOD 30 MIN: CPT | Performed by: INTERNAL MEDICINE

## 2023-03-28 PROCEDURE — 1123F ACP DISCUSS/DSCN MKR DOCD: CPT | Performed by: INTERNAL MEDICINE

## 2023-03-28 NOTE — PROGRESS NOTES
Seen as  patient for diabetes    Interim:    She is back on glipizide as was on steroids    Partially empty sella which can be associated with hormonal abnormalities such as panhypopituitarism and hypothyroidism. 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)  NET per records, not malignant    Known diabetic complications: none   Uncontrolled, moderate    Current diabetic medications     Metformin 1gm BID  Glipizide 2.5mg daily  Januvia 100mg-     tradjenta costly    H/o glimepiride   [place] and [place] costly  States needs medication which is cheaper    Last A1c 7.1%<----5.7%<-----5.4%<-------7.7%<--- 9.4%<------8.6%<-----8.1%<-----6.1%<-----7.6%<----- 7.1% <--- 6.9 <--- 8.3    Prior visit with dietician: Yes   Current diet: on average, 3 meals per day   Current exercise: walking   Current monitoring regimen: home blood tests -1-2   times daily     Has brought blood glucose log/meter: yes  Home blood sugar records:  Any episodes of hypoglycemia? occ in 60s  Worsened by high CHO    No Hx of CAD , PVD, CVA    Hyperlipidemia:   For   Years  Takes lipitor 40mg  Controlled  LDL 71 on 9/20    Hypertension for years  Stable  Takes norvasc 10mg losartan 50mg      Last eye exam: 5/22  Last foot exam: 12/22  Last microalbumin to creatinine ratio: 3/23    B. Subtotal distal pancreatectomy/splenectomy:  - Well-differentiated pancreatic neuroendocrine neoplasm.  - See microscopic description.  - Eleven peripancreatic and 1 splenic hilar lymph nodes (0/12). - Chronic pancreatitis with islet cell hyperplasia. - Spleen without histopathologic abnormality. She has a h/o toxic nodule per record but euthyroid    8/11 thyroid scan         Impression:         1. Hyperfunctioning nodule within the inferior portion of the right lobe   of       the thyroid.        2. The 24-hour thyroid uptake is within

## 2023-07-24 ENCOUNTER — OFFICE VISIT (OUTPATIENT)
Dept: ENDOCRINOLOGY | Age: 68
End: 2023-07-24
Payer: MEDICARE

## 2023-07-24 VITALS
SYSTOLIC BLOOD PRESSURE: 123 MMHG | BODY MASS INDEX: 30.41 KG/M2 | TEMPERATURE: 98 F | RESPIRATION RATE: 14 BRPM | WEIGHT: 189.2 LBS | HEART RATE: 67 BPM | DIASTOLIC BLOOD PRESSURE: 82 MMHG | OXYGEN SATURATION: 98 % | HEIGHT: 66 IN

## 2023-07-24 DIAGNOSIS — E13.9 DM (DIABETES MELLITUS), SECONDARY (HCC): ICD-10-CM

## 2023-07-24 PROCEDURE — 99214 OFFICE O/P EST MOD 30 MIN: CPT | Performed by: INTERNAL MEDICINE

## 2023-07-24 PROCEDURE — 1123F ACP DISCUSS/DSCN MKR DOCD: CPT | Performed by: INTERNAL MEDICINE

## 2023-07-24 RX ORDER — GLIPIZIDE 2.5 MG/1
TABLET, EXTENDED RELEASE ORAL
Qty: 90 TABLET | Refills: 3 | Status: CANCELLED | OUTPATIENT
Start: 2023-07-24

## 2023-07-24 NOTE — PROGRESS NOTES
Seen as  patient for diabetes    Interim:    She will be start steroids  Glucose stable    Partially empty sella which can be associated with hormonal abnormalities such as panhypopituitarism and hypothyroidism. 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)  NET per records, not malignant    Known diabetic complications: none   Uncontrolled, moderate    Current diabetic medications     Metformin 1gm BID  Glipizide 2.5mg daily  Januvia 100mg-     tradjenta costly    H/o glimepiride   [place] and [place] costly  States needs medication which is cheaper    Last A1c 6.1%<-----7.1%<----5.7%<-----5.4%<-------7.7%<--- 9.4%<------8.6%<-----8.1%<-----6.1%<-----7.6%<----- 7.1% <--- 6.9 <--- 8.3    Prior visit with dietician: Yes   Current diet: on average, 3 meals per day   Current exercise: walking   Current monitoring regimen: home blood tests -1-2   times daily     Has brought blood glucose log/meter: yes  Home blood sugar records:  Any episodes of hypoglycemia? occ in 60s  Worsened by high CHO    No Hx of CAD , PVD, CVA    Hyperlipidemia:   For   Years  Takes lipitor 40mg  Controlled  LDL 71 on 9/20  LDL 87 on 11/22    Hypertension for years  Stable  Takes norvasc 10mg losartan 50mg      Last eye exam: 5/23  Last foot exam: 12/22  Last microalbumin to creatinine ratio: 3/23    B. Subtotal distal pancreatectomy/splenectomy:  - Well-differentiated pancreatic neuroendocrine neoplasm.  - See microscopic description.  - Eleven peripancreatic and 1 splenic hilar lymph nodes (0/12). - Chronic pancreatitis with islet cell hyperplasia. - Spleen without histopathologic abnormality. She has a h/o toxic nodule per record but euthyroid    8/11 thyroid scan         Impression:         1. Hyperfunctioning nodule within the inferior portion of the right lobe   of       the thyroid.        2. The 24-hour

## 2023-08-25 NOTE — TELEPHONE ENCOUNTER
EEG REPORT      Yeison Andrade  [id number]  1987    DATE OF SERVICE: 5/28/2023     -4    METHODOLOGY      Extended electroencephalographic recording is made while the patient is ambulatory and continuing normal daily activities.  Electrodes are placed according to the International 10-20 placement system and included T1 and T2 electrode placement.  Twenty four (24) channels of digital signal (sampling rate of 512/sec) was simultaneously recorded from the scalp including EKG and eye monitors.  Recording band pass was 0.1 to 100 hz and all data was stored digitally on the recorder.  The patient is instructed to press an event button when clinical symptoms occur and write the symptoms into a diary. Activation procedures which include photic stimulation, hyperventilation and instructing patients to perform simple task are done in selected patients.        The EEG is displayed on a monitor screen and can be reformatted into different montages for evaluation.  The entire recoding is submitted for computer assisted analysis to detect spike and electrographic seizure activity.  The entire recording is visually reviewed and the times identified by computer analysis as being spikes or seizures are reviewed again.  Compresses spectral analysis (CSA) is also performed on the activity recorded from each individual channel.  This is displayed as a power display of frequencies from 0 to 30 Hz over time.   The CSA analysis is done and displayed continuously.  This is reviewed for asymmetries in power between homologous areas of the scalp and for presence of changes in power which canbe seen when seizures occur.  Sections of suspected abnormalities on the CSA is then compared with the original EEG recording.  .     Zinch software was also utilized in the review of this study.  This software suite analyzes the EEG recording in multiple domains.  Coherence and rhythmicity is computed to identify EEG sections which may  [facility] [place]: [id number]  Outcome  Drug is covered by current benefit plan.  No further PA activity needed contain organized seizures.  Each channel undergoes analysis to detect presence of spike and sharp waves which have special and morphological characteristic of epileptic activity.  The routine EEG recording is converted from spacial into frequency domain.  This is then displayed comparing homologous areas to identify areas of significant asymmetry.  Algorithm to identify non-cortically generated artifact is used to separate eye movement, EMG and other artifact from the EEG     Recording Times    A total of 2:14:02 hours of EEG was recorded.      EEG FINDINGS:  Background activity:     The background rhythm was characterized by polymorphic delta with over riding alpha and theta and generalized spindle activity.  Slowing is more prominent and less organized over the right hemisphere.  Slower frequencies over the left hemisphere resolve over the course of the recording with no posterior dominant rhythm.     Sleep:   No sleep transients although there is cycling of the background.    Activation procedures:   NA    Abnormal activity:   There are abundant sharp waves at T6, O2    IMPRESSION:   Abnormal EEG due to mild left and moderate right hemisphere dysfunction with seizure focus in the right posterior temporal-occipital region.  No electrographic seizures.      Spenser Hooper MD  Neurology-Epilepsy.  [facility].     <-- Click to add NO pertinent Family History

## 2023-10-25 RX ORDER — ASPIRIN 81 MG/1
81 TABLET ORAL DAILY
COMMUNITY

## 2023-10-25 RX ORDER — GLIPIZIDE 2.5 MG/1
2.5 TABLET, EXTENDED RELEASE ORAL DAILY
COMMUNITY

## 2023-10-25 RX ORDER — FOLIC ACID 1 MG/1
1 TABLET ORAL DAILY
COMMUNITY

## 2023-10-25 RX ORDER — OMEPRAZOLE 20 MG/1
20 CAPSULE, DELAYED RELEASE ORAL NIGHTLY PRN
COMMUNITY

## 2023-10-25 RX ORDER — CHOLECALCIFEROL (VITAMIN D3) 1250 MCG
1 CAPSULE ORAL WEEKLY
COMMUNITY

## 2023-10-25 RX ORDER — AZELASTINE HYDROCHLORIDE 0.5 MG/ML
1 SOLUTION/ DROPS OPHTHALMIC 2 TIMES DAILY
COMMUNITY

## 2023-10-25 NOTE — PROGRESS NOTES
[facility] Pre-Admission Testing Electronic Communication Worksheet for OR/ENDO Procedures        Patient: Wyn Phoenix    DOS: 11/3/23    Arrival Time: 8:30AM    Surgery Time: 10AM    Meds to Bed:  [] YES    [x]  NO    Transportation Confirmed: [x] YES    []  NO SON SARITHA    History and Physical:  [] YES    []  NO  [x] N/A  If yes, please list doctor or Urgent Care and date of H&P: DOP DR. GHASTINE    Additional Clearance(Cardiac, Pulmonary, etc):  [] YES    [x]  NO    Pre-Admission Testing Visit:  [] YES    [x]  NO If no, do labs/testing need to be done DOS?   [] YES    [x]  NO    Medication Reconciliation Complete:  [x] YES    []  NO        Additional Notes:    ROUTINE SCREENING    NO BLOOD PT IS JEHOVAH WITNESS            Interview Complete: [x] YES    []  NO          Paola Romero RN  3:27 PM

## 2023-10-25 NOTE — PROGRESS NOTES
[address]  time_0830__________        Surgery time_10:00__________    Take the following medications with a sip of water: Follow your MD/Surgeons pre-procedure instructions regarding your medications     Do not eat or drink anything after 12:00 midnight prior to your surgery. This includes water chewing gum, mints and ice chips. You may brush your teeth and gargle the morning of your surgery, but do not swallow the water     Please see your family doctor/pediatrician for a history and physical and/or concerning medications. Bring any test results/reports from your physicians office. If you are under the care of a heart doctor or specialist doctor, please be aware that you may be asked to them for clearance    You may be asked to stop blood thinners such as Coumadin, Plavix, Fragmin, Lovenox, etc., or any anti-inflammatories such as:  Aspirin, Ibuprofen, Advil, Naproxen prior to your surgery. We also ask that you stop any OTC medications such as fish oil, vitamin E, glucosamine, garlic, Multivitamins, COQ 10, etc. MAY TAKE TYLENOL    We ask that you do not smoke 24 hours prior to surgery  We ask that you do not  drink any alcoholic beverages 24 hours prior to surgery     You must make arrangements for a responsible adult to take you home after your surgery. For your safety you will not be allowed to leave alone or drive yourself home. Your surgery will be cancelled if you do not have a ride home. Also for your safety, it is strongly suggested that someone stay with you the first 24 hours after your surgery. A parent or legal guardian must accompany a child scheduled for surgery and plan to stay at the hospital until the child is discharged. Please do not bring other children with you. For your comfort, please wear simple loose fitting clothing to the hospital.  Please do not bring valuables.     Do not wear any make-up or nail polish on

## 2023-11-02 ENCOUNTER — ANESTHESIA EVENT (OUTPATIENT)
Dept: ENDOSCOPY | Age: 68
End: 2023-11-02
Payer: MEDICARE

## 2023-11-03 ENCOUNTER — HOSPITAL ENCOUNTER (OUTPATIENT)
Age: 68
Setting detail: OUTPATIENT SURGERY
Discharge: HOME | End: 2023-11-03
Attending: INTERNAL MEDICINE | Admitting: INTERNAL MEDICINE
Payer: MEDICARE

## 2023-11-03 ENCOUNTER — ANESTHESIA (OUTPATIENT)
Dept: ENDOSCOPY | Age: 68
End: 2023-11-03
Payer: MEDICARE

## 2023-11-03 VITALS
SYSTOLIC BLOOD PRESSURE: 138 MMHG | HEART RATE: 64 BPM | HEIGHT: 66 IN | DIASTOLIC BLOOD PRESSURE: 71 MMHG | WEIGHT: 192 LBS | TEMPERATURE: 97.4 F | RESPIRATION RATE: 17 BRPM | BODY MASS INDEX: 30.86 KG/M2 | OXYGEN SATURATION: 98 %

## 2023-11-03 LAB
GLUCOSE BLD-MCNC: 106 MG/DL (ref 70–99)
GLUCOSE BLD-MCNC: 147 MG/DL (ref 70–99)
PERFORMED ON: ABNORMAL
PERFORMED ON: ABNORMAL

## 2023-11-03 PROCEDURE — 7100000000 HC PACU RECOVERY - FIRST 15 MIN: Performed by: INTERNAL MEDICINE

## 2023-11-03 PROCEDURE — 3609027000 HC COLONOSCOPY: Performed by: INTERNAL MEDICINE

## 2023-11-03 PROCEDURE — 2580000003 HC RX 258: Performed by: ANESTHESIOLOGY

## 2023-11-03 PROCEDURE — 2500000003 HC RX 250 WO HCPCS: Performed by: NURSE ANESTHETIST, CERTIFIED REGISTERED

## 2023-11-03 PROCEDURE — 6360000002 HC RX W HCPCS: Performed by: NURSE ANESTHETIST, CERTIFIED REGISTERED

## 2023-11-03 PROCEDURE — 7100000011 HC PHASE II RECOVERY - ADDTL 15 MIN: Performed by: INTERNAL MEDICINE

## 2023-11-03 PROCEDURE — 3700000001 HC ADD 15 MINUTES (ANESTHESIA): Performed by: INTERNAL MEDICINE

## 2023-11-03 PROCEDURE — 7100000010 HC PHASE II RECOVERY - FIRST 15 MIN: Performed by: INTERNAL MEDICINE

## 2023-11-03 PROCEDURE — 2709999900 HC NON-CHARGEABLE SUPPLY: Performed by: INTERNAL MEDICINE

## 2023-11-03 PROCEDURE — 3700000000 HC ANESTHESIA ATTENDED CARE: Performed by: INTERNAL MEDICINE

## 2023-11-03 RX ORDER — SODIUM CHLORIDE 0.9 % (FLUSH) 0.9 %
5-40 SYRINGE (ML) INJECTION EVERY 12 HOURS SCHEDULED
Status: DISCONTINUED | OUTPATIENT
Start: 2023-11-03 | End: 2023-11-03 | Stop reason: HOSPADM

## 2023-11-03 RX ORDER — SODIUM CHLORIDE 0.9 % (FLUSH) 0.9 %
5-40 SYRINGE (ML) INJECTION PRN
Status: DISCONTINUED | OUTPATIENT
Start: 2023-11-03 | End: 2023-11-03 | Stop reason: HOSPADM

## 2023-11-03 RX ORDER — PROPOFOL 10 MG/ML
INJECTION, EMULSION INTRAVENOUS PRN
Status: DISCONTINUED | OUTPATIENT
Start: 2023-11-03 | End: 2023-11-03 | Stop reason: SDUPTHER

## 2023-11-03 RX ORDER — LIDOCAINE HYDROCHLORIDE 20 MG/ML
INJECTION, SOLUTION EPIDURAL; INFILTRATION; INTRACAUDAL; PERINEURAL PRN
Status: DISCONTINUED | OUTPATIENT
Start: 2023-11-03 | End: 2023-11-03 | Stop reason: SDUPTHER

## 2023-11-03 RX ORDER — SODIUM CHLORIDE 9 MG/ML
INJECTION, SOLUTION INTRAVENOUS PRN
Status: DISCONTINUED | OUTPATIENT
Start: 2023-11-03 | End: 2023-11-03 | Stop reason: HOSPADM

## 2023-11-03 RX ORDER — ONDANSETRON 2 MG/ML
4 INJECTION INTRAMUSCULAR; INTRAVENOUS
Status: DISCONTINUED | OUTPATIENT
Start: 2023-11-03 | End: 2023-11-03 | Stop reason: HOSPADM

## 2023-11-03 RX ADMIN — SODIUM CHLORIDE: 9 INJECTION, SOLUTION INTRAVENOUS at 08:53

## 2023-11-03 RX ADMIN — LIDOCAINE HYDROCHLORIDE 100 MG: 20 INJECTION, SOLUTION EPIDURAL; INFILTRATION; INTRACAUDAL; PERINEURAL at 10:11

## 2023-11-03 RX ADMIN — PROPOFOL 100 MG: 10 INJECTION, EMULSION INTRAVENOUS at 10:11

## 2023-11-03 RX ADMIN — PROPOFOL 160 MCG/KG/MIN: 10 INJECTION, EMULSION INTRAVENOUS at 10:12

## 2023-11-03 ASSESSMENT — PAIN - FUNCTIONAL ASSESSMENT: PAIN_FUNCTIONAL_ASSESSMENT: 0-10

## 2023-11-03 NOTE — PROGRESS NOTES
Pt received into room 3 from PACU. Pt awake, alert, and oriented. Room air. Vss. Pt stable. Stated she is ready to go home, declines po.

## 2023-11-03 NOTE — DISCHARGE INSTRUCTIONS
time.   Do not stop taking them without consulting your healthcare provider. Don't share them with anyone else. Know what effects and side effects to expect, and report them to your healthcare provider. If you are taking more than one drug, even if it is an over-the-counter medication, herb, or dietary supplement, be sure to check with a physician or pharmacist about drug interactions. Plan ahead for refills so you don't run out. Lifestyle Changes - The results of your colonoscopy will determine if any lifestyle changes are necessary. Follow-up:  The doctor will usually give you a preliminary report after the medication wears off and you are more alert. The results from a biopsy can take as long as 1-2 weeks to be completed. Schedule a follow-up appointment as directed by your doctor. You should schedule a follow-up colonoscopy as recommended by your doctor. Call Your Doctor If Any of the Following Occurs:  Bleeding from your rectum; notify your doctor if you pass a teaspoonful or more of blood   Black, tarry stools   Severe abdominal pain   Hard, swollen abdomen   Signs of infection, including fever or chills   Inability to pass gas or stool   Coughing, shortness of breath, chest pain, severe nausea or vomiting     In case of an emergency, call 911 immediately. Follow-up as needed. Repeat colonoscopy in 10 years. Navya Loo MD    With questions or concerns call Dr Ashley Ford at .

## 2023-11-03 NOTE — ANESTHESIA PRE PROCEDURE
06/01/2021 10:46 AM    GFRAA >60 06/01/2021 10:46 AM    GFRAA 81 08/30/2011 11:42 AM    LABGLOM 55 06/01/2021 10:46 AM    GLUCOSE 140 06/01/2021 10:46 AM    GLUCOSE 112 08/30/2011 11:42 AM     POCGlucose  No results for input(s): \"GLUCOSE\" in the last 72 hours. [facility]    Lab Results   Component Value Date/Time    PROTIME 11.2 12/25/2010 10:05 AM    INR 1.03 12/25/2010 10:05 AM    APTT 36.9 12/25/2010 10:05 AM     HCG (If Applicable) No results found for: \"PREGTESTUR\", \"PREGSERUM\", \"HCG\", \"HCGQUANT\"   ABGs No results found for: \"PHART\", \"PO2ART\", \"JDB1QDO\", \"IIY8PLI\", \"BEART\", \"A1RQVNEV\"   Type & Screen (If Applicable)  No results found for: \"LABABO\", \"LABRH\"                         BMI: Wt Readings from Last 3 Encounters:       NPO Status: 8 HOURS                          Anesthesia Evaluation  Patient summary reviewed   no history of anesthetic complications:   Airway: Mallampati: III  TM distance: >3 FB   Neck ROM: full  Mouth opening: > = 3 FB   Dental:    (+) upper dentures      Pulmonary:normal exam    (+)     sleep apnea: on CPAP,       asthma:                            Cardiovascular:  Exercise tolerance: good (>4 METS)  (+) hypertension (EF 55):, hyperlipidemia        Rhythm: regular  Rate: normal  Echocardiogram reviewed         Beta Blocker:  Not on Beta Blocker         Neuro/Psych:   (+) TIA (no residual effects per pt), psychiatric history:depression/anxiety             GI/Hepatic/Renal:   (+) GERD: well controlled, bowel prep          Endo/Other:    (+) DiabetesType II DM.    (-) blood dyscrasia               Abdominal: normal exam            Vascular: negative vascular ROS. Other Findings:             Anesthesia Plan      MAC     ASA 3       Induction: intravenous. Anesthetic plan and risks discussed with patient. Plan discussed with CRNA.                 This pre-anesthesia assessment may be used as a history and physical.    DOS STAFF ADDENDUM:    Pt seen and examined, chart

## 2023-11-03 NOTE — OP NOTE
Colonoscopy Note    Patient:   Shania Lynch    :    1955    Facility:   [facility] [Outpatient]  Referring/PCP: Ephraim Pham MD  Procedure:   Colonoscopy   Date:     11/3/2023  Endoscopist:  Guanako Albright MD, MD    Preoperative Diagnosis:  screening for colon cancer. Postoperative Diagnosis: Diverticular disease. There were no polyps. Anesthesia: MAC    Estimated blood loss: None    Complications:  None    Specimen: no    Instrument:     Description of Procedure:  Informed consent was obtained from the patient after explanation of the procedure including indications, description of the procedure,  benefits and possible risks and complications of the procedure, and alternatives. Questions were answered. The patient's history was reviewed and a directed physical examination was performed prior to the procedure. Patient was monitored throughout the procedure with pulse oximetry and periodic assessment of vital signs. Patient was sedated as noted above. With the patient initially in the left lateral decubitus position, a digital rectal examination was performed and revealed negative without mass, lesions or tenderness. The Olympus video colonoscope was placed in the patient's rectum and advanced without difficulty  to the cecum, which was identified by the ileocecal valve and appendiceal orifice. The prep was excellent. Examination of the mucosa was performed during both introduction and withdrawal of the colonoscope. Retroflexed view of the rectum was performed. Findings: The mucosa throughout the colon is normal.  There was no evidence of colitis. There were no polyps. Moderate diverticular disease was present sigmoid colon. There were no significant internal hemorrhoids. Tortuous colon. Recommendations: High-fiber diet. Follow-up as needed. Repeat colonoscopy in 10 years.     Guanako Albright MD, MD

## 2023-12-04 ENCOUNTER — OFFICE VISIT (OUTPATIENT)
Dept: ENDOCRINOLOGY | Age: 68
End: 2023-12-04
Payer: MEDICARE

## 2023-12-04 VITALS
RESPIRATION RATE: 15 BRPM | WEIGHT: 193 LBS | TEMPERATURE: 98 F | OXYGEN SATURATION: 95 % | SYSTOLIC BLOOD PRESSURE: 134 MMHG | HEART RATE: 74 BPM | DIASTOLIC BLOOD PRESSURE: 71 MMHG | HEIGHT: 66 IN | BODY MASS INDEX: 31.02 KG/M2

## 2023-12-04 DIAGNOSIS — E11.9 CONTROLLED TYPE 2 DIABETES MELLITUS WITHOUT COMPLICATION, WITHOUT LONG-TERM CURRENT USE OF INSULIN (HCC): Primary | ICD-10-CM

## 2023-12-04 LAB — HBA1C MFR BLD: 6.8 %

## 2023-12-04 PROCEDURE — 1123F ACP DISCUSS/DSCN MKR DOCD: CPT | Performed by: INTERNAL MEDICINE

## 2023-12-04 PROCEDURE — 83036 HEMOGLOBIN GLYCOSYLATED A1C: CPT | Performed by: INTERNAL MEDICINE

## 2023-12-04 PROCEDURE — 99214 OFFICE O/P EST MOD 30 MIN: CPT | Performed by: INTERNAL MEDICINE

## 2023-12-04 RX ORDER — GLIPIZIDE 2.5 MG/1
2.5 TABLET, EXTENDED RELEASE ORAL DAILY
Qty: 30 TABLET | Refills: 3 | Status: SHIPPED | OUTPATIENT
Start: 2023-12-04

## 2023-12-04 NOTE — PROGRESS NOTES
Seen as  patient for diabetes    Interim:    Glucose stable    Partially empty sella which can be associated with hormonal abnormalities such as panhypopituitarism and hypothyroidism. 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)  NET per records, not malignant    Known diabetic complications: none   Uncontrolled, moderate    Current diabetic medications     Metformin 1gm BID  Glipizide 2.5mg daily  Januvia 100mg-     tradjenta costly    H/o glimepiride   [place] and [place] costly  States needs medication which is cheaper    Last A1c 6.8%<----6.1%<-----7.1%<----5.7%<-----5.4%<-------7.7%<--- 9.4%<------8.6%<-----8.1%<-----6.1%<-----7.6%<----- 7.1% <--- 6.9 <--- 8.3    Prior visit with dietician: Yes   Current diet: on average, 3 meals per day   Current exercise: walking   Current monitoring regimen: home blood tests -1-2   times daily     Has brought blood glucose log/meter: yes  Home blood sugar records:  Any episodes of hypoglycemia? occ in 60s  Worsened by high CHO    No Hx of CAD , PVD, CVA    Hyperlipidemia:   For   Years  Takes lipitor 40mg  Controlled  LDL 71 on 9/20  LDL 87 on 11/22   on 10/23  she was out of medicine    Hypertension for years  Stable  Takes norvasc 10mg losartan 50mg      Last eye exam: 5/23  Last foot exam: 12/22  Last microalbumin to creatinine ratio: 3/23    B. Subtotal distal pancreatectomy/splenectomy:  - Well-differentiated pancreatic neuroendocrine neoplasm.  - See microscopic description.  - Eleven peripancreatic and 1 splenic hilar lymph nodes (0/12). - Chronic pancreatitis with islet cell hyperplasia. - Spleen without histopathologic abnormality. She has a h/o toxic nodule per record but euthyroid    8/11 thyroid scan         Impression:         1.  Hyperfunctioning nodule within the inferior portion of the right lobe   of       the

## 2024-02-08 NOTE — TELEPHONE ENCOUNTER
Medication:   Requested Prescriptions     Pending Prescriptions Disp Refills    metFORMIN (GLUCOPHAGE) 500 MG tablet [Pharmacy Med Name: metFORMIN HCl 500 MG Oral Tablet] 60 tablet 0     Sig: TAKE 2 TABLETS BY MOUTH TWICE DAILY WITH MEALS       Last Filled:      Patient Phone Number: [phone] (home)     Last appt: 12/4/2023   Next appt: 6/10/2024    Last Labs DM:   Lab Results   Component Value Date/Time    LABA1C 6.8 12/04/2023 10:23 AM

## 2024-03-26 ENCOUNTER — TELEPHONE (OUTPATIENT)
Dept: ORTHOPEDIC SURGERY | Age: 69
End: 2024-03-26

## 2024-03-26 NOTE — TELEPHONE ENCOUNTER
General Question     Subject: LT KNEE PAIN  Patient and /or Facility Request: [facility]   Contact Number: [phone]    PATIENT CALLED TO SPEAK WITH CLINICAL TO DISCUSS OPTIONS FOR PAIN OF LT KNEE. SOONEST AVAILABILITY 4/2/24 IN FF; SHE IS LOOKING TO SEE WHAT SHE COULD DO IN MEANTIME WHILE WAITING.     PLEASE ADVISE

## 2024-03-26 NOTE — TELEPHONE ENCOUNTER
Spoke with patient. She stated that she needs suggestions on pain relief to get her thorough until her appointment next week. She denies any recent falls or injuries. She has been icing, elevating, and taking OTC Tylenol.   I suggested that she continue to ice and elevate above heart level. She can utilize a knee sleeve or ace wrap for compression. She can try heat if she would like.     We discussed the possibility of a cortisone or gel injection. She will discuss the options with Dr. Funk at her upcoming appointment.

## 2024-04-01 ENCOUNTER — TELEPHONE (OUTPATIENT)
Dept: ENDOCRINOLOGY | Age: 69
End: 2024-04-01

## 2024-04-01 NOTE — TELEPHONE ENCOUNTER
Patient was without Januvia for 2 months because the patient assistance was behind getting to the patient. Her BS went up due to this. PCP put her on Farxiga but she states she had side effects and could not continue. She started back on Januvia at the beginning of March and is also taking metformin.     Patient would like reassurance from MD that she is on the best medication for her. Reports she \"did research\" on metformin and has some concerns. She is also concerned about the patient assistance program being late with her medication and how this affected her levels. She states she would just like to speak to MD about this and know that she is doing what is best for her diabetes.

## 2024-04-02 ENCOUNTER — OFFICE VISIT (OUTPATIENT)
Dept: ORTHOPEDIC SURGERY | Age: 69
End: 2024-04-02
Payer: MEDICARE

## 2024-04-02 VITALS — WEIGHT: 193 LBS | BODY MASS INDEX: 31.02 KG/M2 | HEIGHT: 66 IN

## 2024-04-02 DIAGNOSIS — M17.11 PRIMARY OSTEOARTHRITIS OF RIGHT KNEE: Primary | ICD-10-CM

## 2024-04-02 DIAGNOSIS — M17.12 PRIMARY OSTEOARTHRITIS OF LEFT KNEE: ICD-10-CM

## 2024-04-02 PROCEDURE — 20610 DRAIN/INJ JOINT/BURSA W/O US: CPT | Performed by: ORTHOPAEDIC SURGERY

## 2024-04-02 PROCEDURE — 99213 OFFICE O/P EST LOW 20 MIN: CPT | Performed by: ORTHOPAEDIC SURGERY

## 2024-04-02 PROCEDURE — 1123F ACP DISCUSS/DSCN MKR DOCD: CPT | Performed by: ORTHOPAEDIC SURGERY

## 2024-04-02 RX ORDER — TRIAMCINOLONE ACETONIDE 40 MG/ML
40 INJECTION, SUSPENSION INTRA-ARTICULAR; INTRAMUSCULAR ONCE
Status: COMPLETED | OUTPATIENT
Start: 2024-04-02 | End: 2024-04-02

## 2024-04-02 RX ORDER — LIDOCAINE HYDROCHLORIDE 10 MG/ML
4 INJECTION, SOLUTION INFILTRATION; PERINEURAL ONCE
Status: COMPLETED | OUTPATIENT
Start: 2024-04-02 | End: 2024-04-02

## 2024-04-02 RX ADMIN — TRIAMCINOLONE ACETONIDE 40 MG: 40 INJECTION, SUSPENSION INTRA-ARTICULAR; INTRAMUSCULAR at 14:04

## 2024-04-02 RX ADMIN — LIDOCAINE HYDROCHLORIDE 4 ML: 10 INJECTION, SOLUTION INFILTRATION; PERINEURAL at 14:03

## 2024-04-02 NOTE — PROGRESS NOTES
CHIEF COMPLAINT: Bilateral knee pain/ osteoarthritis.    HISTORY:  Ms. Childs 68 y.o.  female presents today for follow up visit for evaluation of bilateral knee pain which started to worsen over the past few days, but her knees have been bothering her for the past greater than 5 years.  She had bilateral knee cortisone injections on 4/19/2022 with good improvement until recent.  Pain is increase with standing and walking and decrease with rest. Pain is sharp early in the morning with first few steps, dull achy pain by the end of the day.  She rates her pain a 5/10 VAS and worsening.  Alleviating factors: rest. No radiation and no numbness and tingling sensation. No other complaint.  No h/o trauma or gout.  She is previously seen us for her knees in July 2020 and had NSAIDs with some improvement.  She did have bilateral knee cortisone injections on 4/27/2016 with good improvement.  Denies smoking.  She is well-known to me for a right trigger thumb release.    Past Medical History:   Diagnosis Date    Acid reflux     Anxiety and depression     Asthma     Cataract     Diabetes mellitus (HCC)     High blood pressure     Hyperlipidemia     Sleep apnea     USES C-PAP    TIA (transient ischemic attack) 2004    NO RESIDUAL       Past Surgical History:   Procedure Laterality Date    ABDOMEN SURGERY      MASS REMOVED FROM PANCREAS    CATARACT REMOVAL WITH IMPLANT Bilateral     COLONOSCOPY      COLONOSCOPY N/A 11/3/2023    COLONOSCOPY performed by Gabriele Mccord MD at [facility] ENDOSCOPY    ENDOMETRIAL ABLATION      HYSTERECTOMY (CERVIX STATUS UNKNOWN)      UMBILICAL HERNIA REPAIR         Social History     Socioeconomic History    Marital status: Single     Spouse name: Not on file    Number of children: Not on file    Years of education: Not on file    Highest education level: Not on file   Occupational History    Occupation: cook/manager   Tobacco Use    Smoking status: Never    Smokeless tobacco: Never

## 2024-04-12 ENCOUNTER — TELEPHONE (OUTPATIENT)
Dept: ORTHOPEDIC SURGERY | Age: 69
End: 2024-04-12

## 2024-04-12 NOTE — TELEPHONE ENCOUNTER
[facility]  DATE 4/12/2024. DRUG DUROLANE () series of 1 PER KNEE. APPROVED #[id number]. DATES 4/4/2024-4/4/2025. BILATERAL KNEE M17.0 VALID & BILLABLE ON CLAIM YES. Per FAX (Will upload once received) / PHONE (Spoke w/Mel) from INSURANCE [employer] APPROVED.    Spoke with patient. Informed her that her gel injections for bilateral knees has been approved. She stated she would call back later to schedule.

## 2024-04-19 ENCOUNTER — TELEMEDICINE (OUTPATIENT)
Dept: ENDOCRINOLOGY | Age: 69
End: 2024-04-19
Payer: MEDICARE

## 2024-04-19 DIAGNOSIS — E11.9 CONTROLLED TYPE 2 DIABETES MELLITUS WITHOUT COMPLICATION, WITHOUT LONG-TERM CURRENT USE OF INSULIN (HCC): Primary | ICD-10-CM

## 2024-04-19 DIAGNOSIS — E04.1 THYROID NODULE: ICD-10-CM

## 2024-04-19 PROCEDURE — 1123F ACP DISCUSS/DSCN MKR DOCD: CPT | Performed by: INTERNAL MEDICINE

## 2024-04-19 PROCEDURE — 99214 OFFICE O/P EST MOD 30 MIN: CPT | Performed by: INTERNAL MEDICINE

## 2024-04-19 NOTE — PROGRESS NOTES
(COZAAR) 100 MG tablet Take 1 tablet by mouth at bedtime      Famotidine (PEPCID AC PO) Take 1 tablet by mouth daily      amLODIPine (NORVASC) 10 MG tablet Take 1 tablet by mouth at bedtime      Ascorbic Acid (VITAMIN C) 500 MG tablet Take 1 tablet by mouth daily       No current facility-administered medications for this visit.       Review of Systems  Constitutional: Negative for weight loss and malaise/fatigue. Negative for fever and chills.   HENT: Negative for hearing loss, ear pain, nosebleeds, neck pain and tinnitus.    Eyes: Negative for blurred vision. Negative for double vision, photophobia and pain.   Respiratory: Negative for cough and sputum production.    Cardiovascular: Negative for chest pain, palpitations and leg swelling.   Gastrointestinal: Negative for nausea, vomiting and abdominal pain.   Genitourinary: Negative for dysuria, urgency and frequency.   Musculoskeletal: + for back pain. No joint pain  Skin: Negative for itching and rash.   Neurological: Negative for dizziness. Negative for tingling, tremors, focal weakness and headaches.   Endo/Heme/Allergies: see HPI  Psychiatric/Behavioral: Negative for depression and substance abuse.         PHYSICAL EXAMINATION:  [ INSTRUCTIONS:  \"[x]\" Indicates a positive item  \"[]\" Indicates a negative item  -- DELETE ALL ITEMS NOT EXAMINED]  Vital Signs: (As obtained by patient/caregiver or practitioner observation)    Blood pressure-  Heart rate-    Respiratory rate-    Temperature-  Pulse oximetry-     Constitutional Appears well-developed and well-nourished No apparent distress        Mental status  Alert and awake  Oriented to person/place/time  Able to follow commands      Eyes:  EOM    [x]  Normal    Sclera  [x]  Normal           Discharge [x]  None visible      HENT:   [x] Normocephalic, atraumatic.    [x] Mouth/Throat: Mucous membranes are moist.     External Ears [x] Normal  no discharge    Neck: [x] No visualized mass  no

## 2024-04-30 LAB
ANION GAP SERPL CALCULATED.3IONS-SCNC: 12 MMOL/L (ref 7–16)
BUN BLDV-MCNC: 12 MG/DL (ref 8–23)
CALCIUM SERPL-MCNC: 9.6 MG/DL (ref 8.8–10.4)
CHLORIDE BLD-SCNC: 98 MEQ/L (ref 98–107)
CO2: 26 MMOL/L (ref 22–29)
CREAT SERPL-MCNC: 1.03 MG/DL (ref 0.51–1.3)
CREATININE URINE: 223.4 MG/DL
EGFR (CKD-EPI): 58 ML/MIN/1.73 M2
ESTIMATED AVERAGE GLUCOSE: 160 MG/DL
GLUCOSE BLD-MCNC: 219 MG/DL (ref 70–99)
HBA1C MFR BLD: 7.2 % (ref 4.2–5.6)
MICROALBUMIN UR-MCNC: <12 MG/L
MICROALBUMIN/CREAT UR-RTO: NORMAL MG/G CREAT
POTASSIUM SERPL-SCNC: 4.2 MEQ/L (ref 3.5–5)
SODIUM BLD-SCNC: 136 MEQ/L (ref 136–145)

## (undated) DEVICE — ENDOSCOPY KIT: Brand: MEDLINE INDUSTRIES, INC.